# Patient Record
Sex: FEMALE | Race: BLACK OR AFRICAN AMERICAN | Employment: OTHER | URBAN - METROPOLITAN AREA
[De-identification: names, ages, dates, MRNs, and addresses within clinical notes are randomized per-mention and may not be internally consistent; named-entity substitution may affect disease eponyms.]

---

## 2017-08-16 ENCOUNTER — HOSPITAL ENCOUNTER (OUTPATIENT)
Dept: MAMMOGRAPHY | Age: 72
Discharge: HOME | End: 2017-08-16
Attending: OBSTETRICS & GYNECOLOGY
Payer: MEDICARE

## 2017-08-16 DIAGNOSIS — Z12.31 VISIT FOR SCREENING MAMMOGRAM: ICD-10-CM

## 2017-08-16 PROCEDURE — 77067 SCR MAMMO BI INCL CAD: CPT

## 2018-10-22 ENCOUNTER — HOSPITAL ENCOUNTER (OUTPATIENT)
Dept: MAMMOGRAPHY | Age: 73
Discharge: HOME | End: 2018-10-22
Attending: OBSTETRICS & GYNECOLOGY
Payer: MEDICARE

## 2018-10-22 DIAGNOSIS — Z12.31 VISIT FOR SCREENING MAMMOGRAM: ICD-10-CM

## 2018-10-22 PROCEDURE — 77067 SCR MAMMO BI INCL CAD: CPT

## 2018-11-06 ENCOUNTER — HOSPITAL ENCOUNTER (OUTPATIENT)
Dept: GENERAL RADIOLOGY | Age: 73
Discharge: HOME | End: 2018-11-06
Payer: MEDICARE

## 2018-11-06 DIAGNOSIS — R13.19 ESOPHAGEAL DYSPHAGIA: ICD-10-CM

## 2018-11-06 DIAGNOSIS — R13.10 DYSPHAGIA: ICD-10-CM

## 2018-11-06 DIAGNOSIS — R07.9 PAIN IN THE CHEST: ICD-10-CM

## 2018-11-06 PROCEDURE — G8998 SWALLOW D/C STATUS: HCPCS

## 2018-11-06 PROCEDURE — G8997 SWALLOW GOAL STATUS: HCPCS

## 2018-11-06 PROCEDURE — 92611 MOTION FLUOROSCOPY/SWALLOW: CPT

## 2018-11-06 PROCEDURE — G8996 SWALLOW CURRENT STATUS: HCPCS

## 2018-11-06 PROCEDURE — 74230 X-RAY XM SWLNG FUNCJ C+: CPT

## 2018-11-06 NOTE — PROGRESS NOTES
[facility], [address] [facility]    Speech Pathology Modified barium swallow Study  Patient: Mynor Browne (72 y.o. female)  Date: 11/6/2018  Referring Provider:  REBECCA Beverly    SUBJECTIVE:   Patient reported pain/burning in her chest with PO intake, and EGD scheduled for 11/16/18. GI asked patient if she had difficulty swallowing, and patient reported coughing with thin liquids less than once per day, and patient was sent for MBS. PMH includes EGD in 04/2016 showing gastritis, lung cancer s/p resection (no chemo or radiation), emphysema/COPD, GERD, and h/o CVA with no residual deficits. Patient denied history of PNA. OBJECTIVE:   Past Medical History:   Past Medical History:   Diagnosis Date    Arthritis     Autoimmune disease ([facility] [facility]     lupus    CAD (coronary artery disease)     Cancer ([facility] [facility]     lung cancer/ upper right lobe/ 1998    COPD     CVA (cerebral vascular accident) ([facility] [facility] June 2015    GERD (gastroesophageal reflux disease)     Hypertension     Other ill-defined conditions(799.89)     PAD    Other ill-defined conditions(799.89)     elevated cholesterol    Thyroid disease      Past Surgical History:   Procedure Laterality Date    CARDIAC SURG PROCEDURE UNLIST      cabg x5 vessels    CHEST SURGERY PROCEDURE UNLISTED      upper right lobectomy    HX APPENDECTOMY      HX CHOLECYSTECTOMY      HX ORTHOPAEDIC      left foot club foot surgery    HX TUBAL LIGATION      VASCULAR SURGERY PROCEDURE UNLIST      right leg stent    VASCULAR SURGERY PROCEDURE UNLIST      left leg surgery for blockage,no stent     Current Dietary Status:  Regular/thin, reported no difficulty with pills  Radiologist: Dr. Noel White Views: Lateral;Fluoro  Patient Position: upright in chair    Trial 1:   Consistency Presented: Thin liquid;Puree; Solid   How Presented: Self-fed/presented;Cup/gulp;Straw;Successive swallows;Spoon       Bolus Acceptance: No impairment   Bolus Formation/Control: No impairment: Piecemeal;Premature spillage   Propulsion: Delayed (# of seconds)   Oral Residue: None   Initiation of Swallow: Triggered at vallecula   Timing: No impairment   Penetration: None   Aspiration/Timing: No evidence of aspiration   Pharyngeal Clearance: No residue   Attempted Modifications: Double swallow   Effective Modifications: Double swallow   Cues for Modifications: None           Decreased Tongue Base Retraction?: No  Laryngeal Elevation: WFL (within functional limits)  Aspiration/Penetration Score: 1 (No penetration or aspiration-Contrast does not enter the airway)  Pharyngeal Symmetry: Not assessed  Pharyngeal-Esophageal Segment: No impairment  Pharyngeal Dysfunction: None    Oral Phase Severity: Other (comment)(WFL)  Pharyngeal Phase Severity: N/A     In compliance with CMSs Claims Based Outcome Reporting, the following G-code set was chosen for this patient based the use of the NOMS functional outcome to quantify this patient's level of swallowing impairment. Using the NOMS, the patient was determined to be at level 7 for swallow function which correlates with the CH= 0% level of severity. Based on the objective assessment provided within this note, the current, goal, and discharge g-codes are as follows:    Swallow  Swallowing:   Swallow Current Status CH= 0%   Swallow Goal Status CH= 0%   Swallow D/C Status CH= 0%        NOMS Swallowing Levels:  Level 1 (CN): NPO  Level 2 (CM): NPO but takes consistency in therapy  Level 3 (CL): Takes less than 50% of nutrition p.o. and continues with nonoral feedings; and/or safe with mod cues; and/or max diet restriction  Level 4 (CK):  Safe swallow but needs mod cues; and/or mod diet restriction; and/or still requires some nonoral feeding/supplements  Level 5 (CJ): Safe swallow with min diet restriction; and/or needs min cues  Level 6 (CI): Independent with p.o.; rare cues; usually self cues; may need to avoid some foods or needs extra time  Level 7 [facility]): Independent for all p.o.  HAYDE. (2003). National Outcomes Measurement System (NOMS): Adult Speech-Language Pathology User's Guide. ASSESSMENT :  Based on the objective data described above, the patient presents with [facility] oropharyngeal swallow. Patient with posterior bolus loss of thin liquids, however this is overall WFL. Double swallows noted due to piecemeal swallowing of solids. No penetration, aspiration, or pharyngeal residue observed. PLAN/RECOMMENDATIONS :  --Continue current diet  --No further SLP treatment indicated     COMMUNICATION/EDUCATION:   The above findings and recommendations were discussed with: patient who verbalized understanding.     Thank you for this referral.  Vaibhav Moran, SLP  Time Calculation: 30 mins

## 2018-11-15 ENCOUNTER — ANESTHESIA EVENT (OUTPATIENT)
Dept: ENDOSCOPY | Age: 73
End: 2018-11-15
Payer: MEDICARE

## 2018-11-16 ENCOUNTER — HOSPITAL ENCOUNTER (OUTPATIENT)
Age: 73
Setting detail: OUTPATIENT SURGERY
Discharge: HOME | End: 2018-11-16
Attending: INTERNAL MEDICINE | Admitting: INTERNAL MEDICINE
Payer: MEDICARE

## 2018-11-16 ENCOUNTER — ANESTHESIA (OUTPATIENT)
Dept: ENDOSCOPY | Age: 73
End: 2018-11-16
Payer: MEDICARE

## 2018-11-16 VITALS
SYSTOLIC BLOOD PRESSURE: 157 MMHG | DIASTOLIC BLOOD PRESSURE: 85 MMHG | OXYGEN SATURATION: 100 % | WEIGHT: 129.25 LBS | HEIGHT: 64 IN | RESPIRATION RATE: 26 BRPM | HEART RATE: 69 BPM | TEMPERATURE: 98.3 F | BODY MASS INDEX: 22.07 KG/M2

## 2018-11-16 PROCEDURE — 74011250636 HC RX REV CODE- 250/636: Performed by: INTERNAL MEDICINE

## 2018-11-16 PROCEDURE — 76060000031 HC ANESTHESIA FIRST 0.5 HR: Performed by: INTERNAL MEDICINE

## 2018-11-16 PROCEDURE — 74011250636 HC RX REV CODE- 250/636

## 2018-11-16 PROCEDURE — 77030009426 HC FCPS BIOP ENDOSC BSC -B: Performed by: INTERNAL MEDICINE

## 2018-11-16 PROCEDURE — 88305 TISSUE EXAM BY PATHOLOGIST: CPT

## 2018-11-16 PROCEDURE — 76040000019: Performed by: INTERNAL MEDICINE

## 2018-11-16 RX ORDER — PROPOFOL 10 MG/ML
INJECTION, EMULSION INTRAVENOUS AS NEEDED
Status: DISCONTINUED | OUTPATIENT
Start: 2018-11-16 | End: 2018-11-16 | Stop reason: HOSPADM

## 2018-11-16 RX ORDER — FLUMAZENIL 0.1 MG/ML
0.2 INJECTION INTRAVENOUS
Status: DISCONTINUED | OUTPATIENT
Start: 2018-11-16 | End: 2018-11-16 | Stop reason: HOSPADM

## 2018-11-16 RX ORDER — SODIUM CHLORIDE 0.9 % (FLUSH) 0.9 %
5-10 SYRINGE (ML) INJECTION EVERY 8 HOURS
Status: DISCONTINUED | OUTPATIENT
Start: 2018-11-16 | End: 2018-11-16 | Stop reason: HOSPADM

## 2018-11-16 RX ORDER — MIDAZOLAM HYDROCHLORIDE 1 MG/ML
.25-1 INJECTION, SOLUTION INTRAMUSCULAR; INTRAVENOUS
Status: DISCONTINUED | OUTPATIENT
Start: 2018-11-16 | End: 2018-11-16 | Stop reason: HOSPADM

## 2018-11-16 RX ORDER — NALOXONE HYDROCHLORIDE 0.4 MG/ML
0.4 INJECTION, SOLUTION INTRAMUSCULAR; INTRAVENOUS; SUBCUTANEOUS
Status: DISCONTINUED | OUTPATIENT
Start: 2018-11-16 | End: 2018-11-16 | Stop reason: HOSPADM

## 2018-11-16 RX ORDER — EPINEPHRINE 0.1 MG/ML
1 INJECTION INTRACARDIAC; INTRAVENOUS
Status: DISCONTINUED | OUTPATIENT
Start: 2018-11-16 | End: 2018-11-16 | Stop reason: HOSPADM

## 2018-11-16 RX ORDER — ATROPINE SULFATE 0.1 MG/ML
0.5 INJECTION INTRAVENOUS
Status: DISCONTINUED | OUTPATIENT
Start: 2018-11-16 | End: 2018-11-16 | Stop reason: HOSPADM

## 2018-11-16 RX ORDER — LIDOCAINE HYDROCHLORIDE 20 MG/ML
INJECTION, SOLUTION EPIDURAL; INFILTRATION; INTRACAUDAL; PERINEURAL AS NEEDED
Status: DISCONTINUED | OUTPATIENT
Start: 2018-11-16 | End: 2018-11-16 | Stop reason: HOSPADM

## 2018-11-16 RX ORDER — SODIUM CHLORIDE 0.9 % (FLUSH) 0.9 %
5-10 SYRINGE (ML) INJECTION AS NEEDED
Status: DISCONTINUED | OUTPATIENT
Start: 2018-11-16 | End: 2018-11-16 | Stop reason: HOSPADM

## 2018-11-16 RX ORDER — DEXTROMETHORPHAN/PSEUDOEPHED 2.5-7.5/.8
1.2 DROPS ORAL
Status: DISCONTINUED | OUTPATIENT
Start: 2018-11-16 | End: 2018-11-16 | Stop reason: HOSPADM

## 2018-11-16 RX ORDER — SODIUM CHLORIDE 9 MG/ML
INJECTION, SOLUTION INTRAVENOUS
Status: DISCONTINUED | OUTPATIENT
Start: 2018-11-16 | End: 2018-11-16 | Stop reason: HOSPADM

## 2018-11-16 RX ORDER — SODIUM CHLORIDE 9 MG/ML
50 INJECTION, SOLUTION INTRAVENOUS CONTINUOUS
Status: DISCONTINUED | OUTPATIENT
Start: 2018-11-16 | End: 2018-11-16 | Stop reason: HOSPADM

## 2018-11-16 RX ORDER — FENTANYL CITRATE 50 UG/ML
100 INJECTION, SOLUTION INTRAMUSCULAR; INTRAVENOUS
Status: DISCONTINUED | OUTPATIENT
Start: 2018-11-16 | End: 2018-11-16 | Stop reason: HOSPADM

## 2018-11-16 RX ADMIN — PROPOFOL 20 MG: 10 INJECTION, EMULSION INTRAVENOUS at 14:28

## 2018-11-16 RX ADMIN — SODIUM CHLORIDE: 9 INJECTION, SOLUTION INTRAVENOUS at 14:13

## 2018-11-16 RX ADMIN — LIDOCAINE HYDROCHLORIDE 40 MG: 20 INJECTION, SOLUTION EPIDURAL; INFILTRATION; INTRACAUDAL; PERINEURAL at 14:25

## 2018-11-16 RX ADMIN — PROPOFOL 20 MG: 10 INJECTION, EMULSION INTRAVENOUS at 14:26

## 2018-11-16 RX ADMIN — PROPOFOL 20 MG: 10 INJECTION, EMULSION INTRAVENOUS at 14:27

## 2018-11-16 RX ADMIN — PROPOFOL 20 MG: 10 INJECTION, EMULSION INTRAVENOUS at 14:29

## 2018-11-16 RX ADMIN — PROPOFOL 60 MG: 10 INJECTION, EMULSION INTRAVENOUS at 14:25

## 2018-11-16 NOTE — ANESTHESIA POSTPROCEDURE EVALUATION
Post-Anesthesia Evaluation and Assessment Patient: Praveen Berry MRN: [id number]  SSN: [id number] YOB: 1945  Age: 74 y.o. Sex: female I have evaluated the patient and they are stable and ready for discharge from the PACU. Cardiovascular Function/Vital Signs Visit Vitals /85 Pulse 69 Temp 36.8 °C (98.3 °F) Resp 26 Ht 5' 4\" (1.626 m) Wt 58.6 kg (129 lb 4 oz) SpO2 100% Breastfeeding? No  
BMI 22.19 kg/m² Patient is status post MAC anesthesia for Procedure(s): ESOPHAGOGASTRODUODENOSCOPY (EGD) ESOPHAGOGASTRODUODENAL (EGD) BIOPSY. Nausea/Vomiting: None Postoperative hydration reviewed and adequate. Pain: 
Pain Scale 1: Numeric (0 - 10) (11/16/18 1458) Pain Intensity 1: 0 (11/16/18 1458) Managed Neurological Status: At baseline Mental Status, Level of Consciousness: Alert and  oriented to person, place, and time Pulmonary Status:  
O2 Device: Room air (11/16/18 1458) Adequate oxygenation and airway patent Complications related to anesthesia: None Post-anesthesia assessment completed. No concerns Signed By: River Maciel MD   
 November 16, 2018 Procedure(s): ESOPHAGOGASTRODUODENOSCOPY (EGD) ESOPHAGOGASTRODUODENAL (EGD) BIOPSY. <BSHSIANPOST> Visit Vitals /85 Pulse 69 Temp 36.8 °C (98.3 °F) Resp 26 Ht 5' 4\" (1.626 m) Wt 58.6 kg (129 lb 4 oz) SpO2 100% Breastfeeding? No  
BMI 22.19 kg/m²

## 2018-11-16 NOTE — ROUTINE PROCESS
Yisel Migdalia 1945 
[id number] Situation: 
Verbal report received from: Celeste RN Procedure: Procedure(s): ESOPHAGOGASTRODUODENOSCOPY (EGD) ESOPHAGOGASTRODUODENAL (EGD) BIOPSY Background: 
 
Preoperative diagnosis: CHEST PAIN, DYSPHAGIA, GERD, LEFT FLANK PAIN Postoperative diagnosis: mild gastritis, hiatal hernia :  Dr. Azar Mendez Assistant(s): Endoscopy Technician-1: Jena Alston Endoscopy RN-1: Dmitry Presley RN Specimens:  
ID Type Source Tests Collected by Time Destination 1 : pathology Preservative Gastric  Devin Menjivar MD 11/16/2018 1428 Pathology 2 : EWSOPHAGUS Preservative   Devin Menjivar MD 11/16/2018 1430 Pathology H. Pylori  no Assessment: 
Intra-procedure medications Anesthesia gave intra-procedure sedation and medications, see anesthesia flow sheet yes Intravenous fluids: NS@ Regino Pearce Vital signs stable Abdominal assessment: round and soft Recommendation: 
Discharge patient per MD order. Family or Friend Permission to share finding with family or friend yes

## 2018-11-16 NOTE — PROCEDURES
[address] [facility] [address]  [address] [place], [address]  [phone]                            NAME:  Jerrilyn Mcburney   :   1945   MRN:   [id number]     Date/Time:  2018 2:35 PM    Esophagogastroduodenoscopy (EGD) Procedure Note    :  Ann Sánchez MD    Referring Provider:  John Villa MD    Anethesia/Sedation:  MAC anesthesia    Procedure Details   After infomed consent was obtained for the procedure, with all risks and benefits of procedure explained the patient was taken to the endoscopy suite and placed in the left lateral decubitus position. Following sequential administration of sedation as per above, the gastroscope was inserted into the mouth and advanced under direct vision to second portion of the duodenum. A careful inspection was made as the gastroscope was withdrawn, including a retroflexed view of the proximal stomach; findings and interventions are described below. Findings:  Esophagus: normal esophageal mucosa, biopsies obtained from mid-esophagus. GE junction at 36 cm from the incisors. 3 cm sliding hiatal hernia. No narrowing or stenosis. Stomach:diffuse mild gastritis, biopsies obtained  Duodenum/jejunum:normal    Interventions:  biopsy of stomach and biopsy of esophagus           Specimens Removed:    ID Type Source Tests Collected by Time Destination   1 : pathology Preservative Gastric  Ronni Peraza MD 2018 1428 Pathology   2 : [facility] Preservative   Ronni Peraza MD 2018 1430 Pathology       Complications: None. EBL:  Minimal    Impression:    See Postoperative diagnosis above    Recommendations:   - Await pathology. You should receive a letter within 2 weeks. - Resume normal medications.   - Follow up in GI clinic    Discharge disposition:  Home in the company of  when able to ambulate    Ann Sánchez MD

## 2018-11-16 NOTE — DISCHARGE INSTRUCTIONS
[address]     Gastritis: Care Instructions  Your Care Instructions    Gastritis is a sore and upset stomach. It happens when something irritates the stomach lining. Many things can cause it. These include an infection such as the flu or something you ate or drank. Medicines or a sore on the lining of the stomach (ulcer) also can cause it. Your belly may bloat and ache. You may belch, vomit, and feel sick to your stomach. You should be able to relieve the problem by taking medicine. And it may help to change your diet. If gastritis lasts, your doctor may prescribe medicine. Follow-up care is a key part of your treatment and safety. Be sure to make and go to all appointments, and call your doctor if you are having problems. It's also a good idea to know your test results and keep a list of the medicines you take. How can you care for yourself at home? · If your doctor prescribed antibiotics, take them as directed. Do not stop taking them just because you feel better. You need to take the full course of antibiotics. · Be safe with medicines. If your doctor prescribed medicine to decrease stomach acid, take it as directed. Call your doctor if you think you are having a problem with your medicine. · Do not take any other medicine, including over-the-counter pain relievers, without talking to your doctor first.  · If your doctor recommends over-the-counter medicine to reduce stomach acid, such as Pepcid AC, Prilosec, Tagamet HB, or Zantac 75, follow the directions on the label. · Drink plenty of fluids (enough so that your urine is light yellow or clear like water) to prevent dehydration. Choose water and other caffeine-free clear liquids. If you have kidney, heart, or liver disease and have to limit fluids, talk with your doctor before you increase the amount of fluids you drink. · Limit how much alcohol you drink.   · Avoid coffee, tea, cola drinks, chocolate, and other foods with caffeine. They increase stomach acid. When should you call for help? Call 911 anytime you think you may need emergency care. For example, call if:    · You vomit blood or what looks like coffee grounds.     · You pass maroon or very bloody stools.    Call your doctor now or seek immediate medical care if:    · You start breathing fast and have not produced urine in the last 8 hours.     · You cannot keep fluids down.    Watch closely for changes in your health, and be sure to contact your doctor if:    · You do not get better as expected. Where can you learn more? Go to http://bobbyModa2Rideprince.info/. Enter [id number] in the search box to learn more about \"Gastritis: Care Instructions. \"  Current as of: March 28, 2018  Content Version: 11.8  © 0635-8822 [employer] Care instructions adapted under license by [employer] (which disclaims liability or warranty for this information). If you have questions about a medical condition or this instruction, always ask your healthcare professional. [place] [address] any warranty or liability for your use of this information. Hiatal Hernia: Care Instructions  Your Care Instructions  A hiatal hernia occurs when part of the stomach bulges into the chest cavity. A hiatal hernia may allow stomach acid and juices to back up into the esophagus (acid reflux). This can cause a feeling of burning, warmth, heat, or pain behind the breastbone. This feeling may often occur after you eat, soon after you lie down, or when you bend forward, and it may come and go. You also may have a sour taste in your mouth. These symptoms are commonly known as heartburn or reflux. But not all hiatal hernias cause symptoms. Follow-up care is a key part of your treatment and safety. Be sure to make and go to all appointments, and call your doctor if you are having problems.  It's also a good idea to know your test results and keep a list of the medicines you take. How can you care for yourself at home? · Take your medicines exactly as prescribed. Call your doctor if you think you are having a problem with your medicine. · Do not take aspirin or other nonsteroidal anti-inflammatory drugs (NSAIDs), such as ibuprofen (Advil, Motrin) or naproxen (Aleve), unless your doctor says it is okay. Ask your doctor what you can take for pain. · Your doctor may recommend over-the-counter medicine. For mild or occasional indigestion, antacids such as Tums, Gaviscon, Maalox, or Mylanta may help. Your doctor also may recommend over-the-counter acid reducers, such as famotidine (Pepcid AC), cimetidine (Tagamet HB), ranitidine (Zantac 75 and Zantac 150), or omeprazole (Prilosec). Read and follow all instructions on the label. If you use these medicines often, talk with your doctor. · Change your eating habits. ? It's best to eat several small meals instead of two or three large meals. ? After you eat, wait 2 to 3 hours before you lie down. Late-night snacks aren't a good idea. ? Chocolate, mint, and alcohol can make heartburn worse. They relax the valve between the esophagus and the stomach. ? Spicy foods, foods that have a lot of acid (like tomatoes and oranges), and coffee can make heartburn symptoms worse in some people. If your symptoms are worse after you eat a certain food, you may want to stop eating that food to see if your symptoms get better. · Do not smoke or chew tobacco.  · If you get heartburn at night, raise the head of your bed 6 to 8 inches by putting the frame on blocks or placing a foam wedge under the head of your mattress. (Adding extra pillows does not work.)  · Do not wear tight clothing around your middle. · Lose weight if you need to. Losing just 5 to 10 pounds can help. When should you call for help?   Call your doctor now or seek immediate medical care if:    · You have new or worse belly pain.     · You are vomiting.    Watch closely for changes in your health, and be sure to contact your doctor if:    · You have new or worse symptoms of indigestion.     · You have trouble or pain swallowing.     · You are losing weight.     · You do not get better as expected. Where can you learn more? Go to http://bobby-prince.info/. Enter Y119 in the search box to learn more about \"Hiatal Hernia: Care Instructions. \"  Current as of: March 28, 2018  Content Version: 11.8  © 2122-1041 [employer]. Care instructions adapted under license by [employer] (which disclaims liability or warranty for this information). If you have questions about a medical condition or this instruction, always ask your healthcare professional. [place] [address] any warranty or liability for your use of this information. [address] [place] [address]  [phone]                                  Calvin Bowser  [id number]  1945    It was my pleasure seeing you for your procedure. You will also receive a summary report with the findings from this procedure and any further recommendations. If you had polyps removed or biopsies taken during your procedure, you will receive a separate letter from me within the next 2 weeks. If you don't receive this letter or if you have any questions, please call my office [phone]. Please take note of the post procedure instructions listed below. Best Wishes,    Dr. Joey Pham    These instructions give you information on caring for yourself after your procedure. Call your doctor if you have any problems or questions after your procedure. HOME CARE  · Walk if you have belly cramping or gas. Walking will help get rid of the air and reduce the bloated feeling in your belly (abdomen). · Your IV site (where you received drugs) may be tender to touch.   Place warm towels on the site; keep your arm up on two pillows if you have any swelling or soreness in the area. · You may shower. ACTIVITY:  · Take frequent rest periods and move at a slower pace for the next 24 hours. .  · You may resume your regular activity tomorrow if you are feeling back to normal.  · Do not drive or ride a bicycle for at least 24 hours (because of the medicine (anesthesia) used during the test). · Do not sign any important legal documents or use or operate any machinery for 24 hours  · Do not take sleeping medicines/nerve drugs for 24 hours unless the doctor tells you. · You can return to work/school tomorrow unless otherwise instructed. NUTRITION:  · Drink plenty of fluids to keep your pee (urine) clear or pale yellow  · Begin with a light meal and progress to your normal diet. Heavy or fried foods are harder to digest and may make you feel sick to your stomach (nauseated). · Once you are feeling back to normal, you may resume your normal diet as instructed by your doctor. · Avoid alcoholic beverages for 24 hours or as instructed. IF YOU HAD BIOPSIES TAKEN OR POLYPS REMOVED DURING THE PROCEDURE:  · For the next 7 days, avoid all non-steroidal antiinflammatory medications such as Ibuprofen, Motrin, Advil, Alleve, Priti-seltzer, Goody's powder, BC powder. · If you do not have an heart condition that requires you to take a daily aspirin, you should avoid taking aspirin for 7 days. · Eat a soft diet for 24 hours. · Monitor your stools for any blood or dark black, tar-like, stools as this may be a sign of bleeding and if you see any blood, notify your doctor immediately. GET HELP RIGHT AWAY AND SEEK IMMEDIATE MEDICAL CARE IF:  · You have more than a spotting of blood in your stool. · You pass clumps of tissue (blood clots) or fill the toilet with blood. · Your belly is painfully swollen or puffy (abdominal distention). · You throw up (vomit). · You have a fever.   · You have redness, pain or swelling at the IV site that last greater than two days.  · You have abdominal pain or discomfort that is severe or gets worse throughout the day. Post-procedure diagnosis:  mild gastritis, hiatal hernia    Post-procedure recommendations:  - Await pathology. You should receive a letter within 2 weeks. - Resume normal medications.   - Follow up in GI clinic

## 2018-11-16 NOTE — ANESTHESIA PREPROCEDURE EVALUATION
Anesthetic History No history of anesthetic complications Review of Systems / Medical History Patient summary reviewed, nursing notes reviewed and pertinent labs reviewed Pulmonary COPD Neuro/Psych CVA Cardiovascular Hypertension CHF Past MI, CAD, PAD, CABG and hyperlipidemia GI/Hepatic/Renal 
  
GERD Endo/Other Hypothyroidism Arthritis Other Findings Comments: Hx SLE Physical Exam 
 
Airway Mallampati: II 
TM Distance: > 6 cm Neck ROM: normal range of motion Mouth opening: Normal 
 
 Cardiovascular Regular rate and rhythm,  S1 and S2 normal,  no murmur, click, rub, or gallop Dental 
 
Dentition: Full lower dentures and Full upper dentures Pulmonary Breath sounds clear to auscultation Abdominal 
GI exam deferred Other Findings Anesthetic Plan ASA: 3 Anesthesia type: MAC Induction: Intravenous Anesthetic plan and risks discussed with: Patient

## 2018-11-16 NOTE — H&P
[address] [facility] 
[address] [place], [address] 
[phone] History and Physical  
 
NAME: Sammi Arias :  1945 MRN:  [id number] HPI:  The patient was seen and examined. Past Surgical History:  
Procedure Laterality Date  CARDIAC SURG PROCEDURE UNLIST    
 cabg x5 vessels  CHEST SURGERY PROCEDURE UNLISTED    
 upper right lobectomy  HX APPENDECTOMY  HX CHOLECYSTECTOMY  HX ORTHOPAEDIC    
 left foot club foot surgery  HX TUBAL LIGATION    
 VASCULAR SURGERY PROCEDURE UNLIST    
 right leg stent  VASCULAR SURGERY PROCEDURE UNLIST    
 left leg surgery for blockage,no stent Past Medical History:  
Diagnosis Date  Arthritis  Autoimmune disease ([facility] [address] lupus  CAD (coronary artery disease)  Cancer ([facility] [address] [facility] lung cancer/ upper right lobe/   COPD  CVA ([facility]rebral vascular accident) [facility]) 2015  GERD (gastroesophageal reflux disease)  Hypertension  Other ill-defined conditions(799.89) PAD  Other ill-defined conditions(799.89)   
 elevated cholesterol  Thyroid disease Social History Tobacco Use  Smoking status: Former Smoker Packs/day: 1.00 Years: 42.00 Pack years: 42.00 Last attempt to quit: 1998 Years since quittin.5  Smokeless tobacco: Never Used Substance Use Topics  Alcohol use: No  
 Drug use: No  
 
Allergies Allergen Reactions  Adhesive Rash  Bactrim [Sulfamethoprim Ds] Nausea Only  Cardizem [Diltiazem Hcl] Swelling  
  lightheadness History reviewed. No pertinent family history. No current facility-administered medications for this encounter. PHYSICAL EXAM: 
General: WD, WN. Alert, cooperative, no acute distress   
HEENT: NC, Atraumatic. PERRLA, EOMI. Anicteric sclerae. Lungs:  CTA Bilaterally. No Wheezing/Rhonchi/Rales. Heart:  Regular  rhythm,  No murmur, No Rubs, No Gallops Abdomen: Soft, Non distended, Non tender.  +Bowel sounds, no HSM Extremities: No c/c/e Neurologic:  CN 2-12 gi, Alert and oriented X 3. No acute neurological distress Psych:   Good insight. Not anxious nor agitated. The heart, lungs and mental status were satisfactory for the administration of MAC sedation and for the procedure. Mallampati score: 2 Assessment: · Chest pain, GERD Plan:  
· Endoscopic procedure :EGD 
· MAC sedation

## 2019-11-26 ENCOUNTER — HOSPITAL ENCOUNTER (OUTPATIENT)
Dept: MAMMOGRAPHY | Age: 74
Discharge: HOME | End: 2019-11-26
Attending: OBSTETRICS & GYNECOLOGY

## 2019-11-26 DIAGNOSIS — Z12.31 VISIT FOR SCREENING MAMMOGRAM: ICD-10-CM

## 2020-02-19 ENCOUNTER — HOSPITAL ENCOUNTER (OUTPATIENT)
Dept: CARDIAC REHAB | Age: 75
Discharge: HOME | End: 2020-02-19
Payer: MEDICARE

## 2020-02-19 PROCEDURE — 93798 PHYS/QHP OP CAR RHAB W/ECG: CPT

## 2020-02-19 RX ORDER — IPRATROPIUM BROMIDE 42 UG/1
2 SPRAY, METERED NASAL DAILY
COMMUNITY

## 2020-02-19 RX ORDER — RANOLAZINE 500 MG/1
500 TABLET, EXTENDED RELEASE ORAL 2 TIMES DAILY
COMMUNITY

## 2020-02-19 RX ORDER — FUROSEMIDE 40 MG/1
40 TABLET ORAL DAILY
COMMUNITY

## 2020-02-19 NOTE — CARDIO/PULMONARY
Cardiopulmonary Rehab Orientation: Met with Mrs. Do Ramsey  for the initial session. Mrs. Rossy Cortes is a 66year-old patient of Dr. June Eaton, who presents to rehab for cardiac conditioning and strengthening, S/P CHF; LVEF 15-20 %. History also includes breast Ca, CVA, NSTEMI, leg stents, CAD, CABG, Lupus, COPD, HTN, and HLD. Allergy to Bactrim and Cardizem. Immunization for influenza vaccine UTD. Pt is a former smoker. Lungs were CTA; denied any cough. Bilateral edema 2+, she is on Lasix. Exercise at home includes:  none Patient was given an educational notebook. Psychosocial: Pt is  and has 2 supportive children. Her daughter takes her to most appointments. Pt scored a 6 on PHQ-9 screening tool and PCP faxed. She denies depression and anxiety. Pt admits to moderate stress related to medical finances. She enjoys reading and playing her iPad games for relaxation. BMI 21, based on height of 5'3\" and weight 123 lbs. Pt completed 6MW test on treadmill with 170 meters and 2.2 METS, with slight SOB. Patient's identified goals are:  
1. Increase strength in legs (rid of rollator) 2. Increase endurance/stamina 3. Gain weight Plan: Return for first exercise session on 2/21/19

## 2020-02-20 VITALS — WEIGHT: 123 LBS | BODY MASS INDEX: 21.11 KG/M2

## 2020-02-21 ENCOUNTER — HOSPITAL ENCOUNTER (OUTPATIENT)
Dept: CARDIAC REHAB | Age: 75
Discharge: HOME | End: 2020-02-21
Payer: MEDICARE

## 2020-02-21 VITALS — BODY MASS INDEX: 20.94 KG/M2 | WEIGHT: 122 LBS

## 2020-02-21 PROCEDURE — 93798 PHYS/QHP OP CAR RHAB W/ECG: CPT

## 2020-02-26 ENCOUNTER — APPOINTMENT (OUTPATIENT)
Dept: CARDIAC REHAB | Age: 75
End: 2020-02-26
Payer: MEDICARE

## 2020-02-28 ENCOUNTER — APPOINTMENT (OUTPATIENT)
Dept: GENERAL RADIOLOGY | Age: 75
DRG: 315 | End: 2020-02-28
Attending: EMERGENCY MEDICINE
Payer: MEDICARE

## 2020-02-28 ENCOUNTER — HOSPITAL ENCOUNTER (INPATIENT)
Age: 75
LOS: 4 days | Discharge: HOME | DRG: 315 | End: 2020-03-03
Attending: EMERGENCY MEDICINE | Admitting: HOSPITALIST
Payer: MEDICARE

## 2020-02-28 DIAGNOSIS — E86.0 DEHYDRATION: ICD-10-CM

## 2020-02-28 DIAGNOSIS — N17.9 ACUTE RENAL FAILURE, UNSPECIFIED ACUTE RENAL FAILURE TYPE (HCC): Primary | ICD-10-CM

## 2020-02-28 DIAGNOSIS — I95.9 HYPOTENSION, UNSPECIFIED HYPOTENSION TYPE: ICD-10-CM

## 2020-02-28 PROBLEM — I50.22 CHRONIC SYSTOLIC CHF (CONGESTIVE HEART FAILURE) (HCC): Status: ACTIVE | Noted: 2020-02-28

## 2020-02-28 LAB
ALBUMIN SERPL-MCNC: 3.4 G/DL (ref 3.5–5)
ALBUMIN/GLOB SERPL: 1 {RATIO} (ref 1.1–2.2)
ALP SERPL-CCNC: 97 U/L (ref 45–117)
ALT SERPL-CCNC: 38 U/L (ref 12–78)
ANION GAP SERPL CALC-SCNC: 6 MMOL/L (ref 5–15)
APPEARANCE UR: ABNORMAL
AST SERPL-CCNC: 52 U/L (ref 15–37)
ATRIAL RATE: 66 BPM
BACTERIA URNS QL MICRO: ABNORMAL /HPF
BASOPHILS # BLD: 0 K/UL (ref 0–0.1)
BASOPHILS NFR BLD: 1 % (ref 0–1)
BILIRUB SERPL-MCNC: 0.6 MG/DL (ref 0.2–1)
BILIRUB UR QL CFM: NEGATIVE
BNP SERPL-MCNC: ABNORMAL PG/ML
BUN SERPL-MCNC: 40 MG/DL (ref 6–20)
BUN/CREAT SERPL: 16 (ref 12–20)
CALCIUM SERPL-MCNC: 8.7 MG/DL (ref 8.5–10.1)
CALCULATED R AXIS, ECG10: -95 DEGREES
CALCULATED T AXIS, ECG11: 85 DEGREES
CHLORIDE SERPL-SCNC: 100 MMOL/L (ref 97–108)
CK SERPL-CCNC: 176 U/L (ref 26–192)
CO2 SERPL-SCNC: 27 MMOL/L (ref 21–32)
COLOR UR: ABNORMAL
CREAT SERPL-MCNC: 2.53 MG/DL (ref 0.55–1.02)
DIAGNOSIS, 93000: NORMAL
DIFFERENTIAL METHOD BLD: ABNORMAL
EOSINOPHIL # BLD: 0 K/UL (ref 0–0.4)
EOSINOPHIL NFR BLD: 0 % (ref 0–7)
EPITH CASTS URNS QL MICRO: ABNORMAL /LPF
ERYTHROCYTE [DISTWIDTH] IN BLOOD BY AUTOMATED COUNT: 18 % (ref 11.5–14.5)
GLOBULIN SER CALC-MCNC: 3.4 G/DL (ref 2–4)
GLUCOSE SERPL-MCNC: 89 MG/DL (ref 65–100)
GLUCOSE UR STRIP.AUTO-MCNC: NEGATIVE MG/DL
HCT VFR BLD AUTO: 42.9 % (ref 35–47)
HGB BLD-MCNC: 14 G/DL (ref 11.5–16)
HGB UR QL STRIP: NEGATIVE
IMM GRANULOCYTES # BLD AUTO: 0 K/UL (ref 0–0.04)
IMM GRANULOCYTES NFR BLD AUTO: 0 % (ref 0–0.5)
KETONES UR QL STRIP.AUTO: ABNORMAL MG/DL
LEUKOCYTE ESTERASE UR QL STRIP.AUTO: ABNORMAL
LYMPHOCYTES # BLD: 0.9 K/UL (ref 0.8–3.5)
LYMPHOCYTES NFR BLD: 28 % (ref 12–49)
MCH RBC QN AUTO: 27.7 PG (ref 26–34)
MCHC RBC AUTO-ENTMCNC: 32.6 G/DL (ref 30–36.5)
MCV RBC AUTO: 84.8 FL (ref 80–99)
MONOCYTES # BLD: 0.4 K/UL (ref 0–1)
MONOCYTES NFR BLD: 12 % (ref 5–13)
NEUTS SEG # BLD: 1.9 K/UL (ref 1.8–8)
NEUTS SEG NFR BLD: 60 % (ref 32–75)
NITRITE UR QL STRIP.AUTO: POSITIVE
NRBC # BLD: 0 K/UL (ref 0–0.01)
NRBC BLD-RTO: 0 PER 100 WBC
P-R INTERVAL, ECG05: 288 MS
PH UR STRIP: 5 [PH] (ref 5–8)
PLATELET # BLD AUTO: 109 K/UL (ref 150–400)
PMV BLD AUTO: 12.2 FL (ref 8.9–12.9)
POTASSIUM SERPL-SCNC: 4.4 MMOL/L (ref 3.5–5.1)
PROT SERPL-MCNC: 6.8 G/DL (ref 6.4–8.2)
PROT UR STRIP-MCNC: 300 MG/DL
Q-T INTERVAL, ECG07: 506 MS
QRS DURATION, ECG06: 210 MS
QTC CALCULATION (BEZET), ECG08: 530 MS
RBC # BLD AUTO: 5.06 M/UL (ref 3.8–5.2)
RBC #/AREA URNS HPF: ABNORMAL /HPF
SODIUM SERPL-SCNC: 133 MMOL/L (ref 136–145)
SP GR UR REFRACTOMETRY: 1.02 (ref 1–1.03)
TROPONIN I SERPL-MCNC: 0.66 NG/ML
TROPONIN I SERPL-MCNC: 0.82 NG/ML
UA: UC IF INDICATED,UAUC: ABNORMAL
UROBILINOGEN UR QL STRIP.AUTO: 1 EU/DL (ref 0.2–1)
VENTRICULAR RATE, ECG03: 66 BPM
WBC # BLD AUTO: 3.2 K/UL (ref 3.6–11)
WBC CASTS URNS QL MICRO: ABNORMAL /LPF
WBC URNS QL MICRO: ABNORMAL /HPF
YEAST BUDDING URNS QL: PRESENT

## 2020-02-28 PROCEDURE — 81001 URINALYSIS AUTO W/SCOPE: CPT

## 2020-02-28 PROCEDURE — 94640 AIRWAY INHALATION TREATMENT: CPT

## 2020-02-28 PROCEDURE — 80053 COMPREHEN METABOLIC PANEL: CPT

## 2020-02-28 PROCEDURE — 96360 HYDRATION IV INFUSION INIT: CPT

## 2020-02-28 PROCEDURE — 84484 ASSAY OF TROPONIN QUANT: CPT

## 2020-02-28 PROCEDURE — 74011250637 HC RX REV CODE- 250/637: Performed by: EMERGENCY MEDICINE

## 2020-02-28 PROCEDURE — 94761 N-INVAS EAR/PLS OXIMETRY MLT: CPT

## 2020-02-28 PROCEDURE — 87086 URINE CULTURE/COLONY COUNT: CPT

## 2020-02-28 PROCEDURE — 99284 EMERGENCY DEPT VISIT MOD MDM: CPT

## 2020-02-28 PROCEDURE — 82550 ASSAY OF CK (CPK): CPT

## 2020-02-28 PROCEDURE — 74011250636 HC RX REV CODE- 250/636: Performed by: INTERNAL MEDICINE

## 2020-02-28 PROCEDURE — 71045 X-RAY EXAM CHEST 1 VIEW: CPT

## 2020-02-28 PROCEDURE — 83880 ASSAY OF NATRIURETIC PEPTIDE: CPT

## 2020-02-28 PROCEDURE — 74011000250 HC RX REV CODE- 250: Performed by: EMERGENCY MEDICINE

## 2020-02-28 PROCEDURE — 74011250636 HC RX REV CODE- 250/636: Performed by: EMERGENCY MEDICINE

## 2020-02-28 PROCEDURE — 87077 CULTURE AEROBIC IDENTIFY: CPT

## 2020-02-28 PROCEDURE — 85025 COMPLETE CBC W/AUTO DIFF WBC: CPT

## 2020-02-28 PROCEDURE — 36415 COLL VENOUS BLD VENIPUNCTURE: CPT

## 2020-02-28 PROCEDURE — 93005 ELECTROCARDIOGRAM TRACING: CPT

## 2020-02-28 PROCEDURE — 65660000000 HC RM CCU STEPDOWN

## 2020-02-28 PROCEDURE — 74011250637 HC RX REV CODE- 250/637: Performed by: INTERNAL MEDICINE

## 2020-02-28 PROCEDURE — 77030029684 HC NEB SM VOL KT MONA -A

## 2020-02-28 PROCEDURE — 87186 SC STD MICRODIL/AGAR DIL: CPT

## 2020-02-28 RX ORDER — IPRATROPIUM BROMIDE 42 UG/1
2 SPRAY, METERED NASAL DAILY
Status: DISCONTINUED | OUTPATIENT
Start: 2020-02-29 | End: 2020-03-03 | Stop reason: HOSPADM

## 2020-02-28 RX ORDER — DOCUSATE SODIUM 100 MG/1
100 CAPSULE, LIQUID FILLED ORAL
Status: DISCONTINUED | OUTPATIENT
Start: 2020-02-28 | End: 2020-03-03 | Stop reason: HOSPADM

## 2020-02-28 RX ORDER — SODIUM CHLORIDE 0.9 % (FLUSH) 0.9 %
5-40 SYRINGE (ML) INJECTION EVERY 8 HOURS
Status: DISCONTINUED | OUTPATIENT
Start: 2020-02-28 | End: 2020-03-03 | Stop reason: HOSPADM

## 2020-02-28 RX ORDER — ASPIRIN 325 MG
325 TABLET ORAL ONCE
Status: COMPLETED | OUTPATIENT
Start: 2020-02-28 | End: 2020-02-28

## 2020-02-28 RX ORDER — IPRATROPIUM BROMIDE 0.5 MG/2.5ML
0.5 SOLUTION RESPIRATORY (INHALATION)
Status: DISCONTINUED | OUTPATIENT
Start: 2020-02-28 | End: 2020-02-29

## 2020-02-28 RX ORDER — ARFORMOTEROL TARTRATE 15 UG/2ML
15 SOLUTION RESPIRATORY (INHALATION)
Status: DISCONTINUED | OUTPATIENT
Start: 2020-02-28 | End: 2020-02-29

## 2020-02-28 RX ORDER — MIDODRINE HYDROCHLORIDE 5 MG/1
10 TABLET ORAL ONCE
Status: COMPLETED | OUTPATIENT
Start: 2020-02-28 | End: 2020-02-28

## 2020-02-28 RX ORDER — RANOLAZINE 500 MG/1
500 TABLET, EXTENDED RELEASE ORAL 2 TIMES DAILY
Status: DISCONTINUED | OUTPATIENT
Start: 2020-02-28 | End: 2020-03-03 | Stop reason: HOSPADM

## 2020-02-28 RX ORDER — LEVOTHYROXINE SODIUM 112 UG/1
112 TABLET ORAL
Status: DISCONTINUED | OUTPATIENT
Start: 2020-02-29 | End: 2020-03-03 | Stop reason: HOSPADM

## 2020-02-28 RX ORDER — SODIUM CHLORIDE 0.9 % (FLUSH) 0.9 %
5-40 SYRINGE (ML) INJECTION AS NEEDED
Status: DISCONTINUED | OUTPATIENT
Start: 2020-02-28 | End: 2020-03-03 | Stop reason: HOSPADM

## 2020-02-28 RX ORDER — HYDROXYCHLOROQUINE SULFATE 200 MG/1
200 TABLET, FILM COATED ORAL 2 TIMES DAILY
Status: DISCONTINUED | OUTPATIENT
Start: 2020-02-28 | End: 2020-03-03 | Stop reason: HOSPADM

## 2020-02-28 RX ORDER — HEPARIN SODIUM 5000 [USP'U]/ML
5000 INJECTION, SOLUTION INTRAVENOUS; SUBCUTANEOUS EVERY 8 HOURS
Status: DISCONTINUED | OUTPATIENT
Start: 2020-02-28 | End: 2020-03-03 | Stop reason: HOSPADM

## 2020-02-28 RX ORDER — ALBUTEROL SULFATE 90 UG/1
2 AEROSOL, METERED RESPIRATORY (INHALATION)
Status: DISCONTINUED | OUTPATIENT
Start: 2020-02-28 | End: 2020-03-03 | Stop reason: HOSPADM

## 2020-02-28 RX ORDER — GUAIFENESIN 100 MG/5ML
81 LIQUID (ML) ORAL DAILY
Status: DISCONTINUED | OUTPATIENT
Start: 2020-02-29 | End: 2020-03-03 | Stop reason: HOSPADM

## 2020-02-28 RX ORDER — ACETAMINOPHEN 325 MG/1
650 TABLET ORAL
Status: DISCONTINUED | OUTPATIENT
Start: 2020-02-28 | End: 2020-03-03 | Stop reason: HOSPADM

## 2020-02-28 RX ORDER — PRAVASTATIN SODIUM 40 MG/1
40 TABLET ORAL
Status: DISCONTINUED | OUTPATIENT
Start: 2020-02-28 | End: 2020-03-03 | Stop reason: HOSPADM

## 2020-02-28 RX ORDER — SODIUM CHLORIDE 9 MG/ML
50 INJECTION, SOLUTION INTRAVENOUS CONTINUOUS
Status: DISPENSED | OUTPATIENT
Start: 2020-02-28 | End: 2020-02-29

## 2020-02-28 RX ADMIN — MIDODRINE HYDROCHLORIDE 10 MG: 5 TABLET ORAL at 17:21

## 2020-02-28 RX ADMIN — SODIUM CHLORIDE 250 ML: 900 INJECTION, SOLUTION INTRAVENOUS at 14:30

## 2020-02-28 RX ADMIN — HYDROXYCHLOROQUINE SULFATE 200 MG: 200 TABLET ORAL at 18:48

## 2020-02-28 RX ADMIN — Medication 10 ML: at 17:18

## 2020-02-28 RX ADMIN — ASPIRIN 325 MG: 325 TABLET, FILM COATED ORAL at 14:30

## 2020-02-28 RX ADMIN — HEPARIN SODIUM 5000 UNITS: 5000 INJECTION INTRAVENOUS; SUBCUTANEOUS at 17:19

## 2020-02-28 RX ADMIN — PRAVASTATIN SODIUM 40 MG: 40 TABLET ORAL at 21:36

## 2020-02-28 RX ADMIN — Medication 10 ML: at 21:37

## 2020-02-28 RX ADMIN — SODIUM CHLORIDE 250 ML: 900 INJECTION, SOLUTION INTRAVENOUS at 13:22

## 2020-02-28 RX ADMIN — SODIUM CHLORIDE 50 ML/HR: 900 INJECTION, SOLUTION INTRAVENOUS at 17:18

## 2020-02-28 RX ADMIN — ARFORMOTEROL TARTRATE 15 MCG: 15 SOLUTION RESPIRATORY (INHALATION) at 19:59

## 2020-02-28 RX ADMIN — IPRATROPIUM BROMIDE 0.5 MG: 0.5 SOLUTION RESPIRATORY (INHALATION) at 20:00

## 2020-02-28 NOTE — ED NOTES
TRANSFER - OUT REPORT:    Verbal report given to [facility] on Oneyda Parents  being transferred to PCU for routine progression of care       Report consisted of patients Situation, Background, Assessment and   Recommendations(SBAR). Information from the following report(s) SBAR and ED Summary was reviewed with the receiving nurse. Lines:   Peripheral IV 02/28/20 Left Antecubital (Active)   Site Assessment Clean, dry, & intact 2/28/2020 12:58 PM   Phlebitis Assessment 0 2/28/2020 12:58 PM   Infiltration Assessment 0 2/28/2020 12:58 PM   Dressing Status Clean, dry, & intact 2/28/2020 12:58 PM        Opportunity for questions and clarification was provided.       Patient transported with:   Monitor

## 2020-02-28 NOTE — ACP (ADVANCE CARE PLANNING)
Advance Care Planning Note      NAME: Clifton Voss   :  1945   MRN:  [id number]     Date/Time:  2020 4:24 PM    Active Diagnoses:  Hospital Problems  Date Reviewed: 2018          Codes Class Noted POA    Dehydration ICD-10-CM: E86.0  ICD-9-CM: 276.51  2020 Unknown        Hypotension ICD-10-CM: I95.9  ICD-9-CM: 458.9  2020 Unknown        KARMA (acute kidney injury) [facility]) ICD-10-CM: N17.9  ICD-9-CM: 584.9  2020 Unknown        Chronic systolic CHF (congestive heart failure) ([facility]) ICD-10-CM: I50.22  ICD-9-CM: 428.22, 428.0  2020 Unknown          Severe systolic cardiomyopathy  Lupus  Hypertension  COPD  CVA  Atrial fibrillation  Coronary artery disease    These active diagnoses are of sufficient risk that focused discussion on advance care planning is indicated in order to allow the patient to thoughtfully consider personal goals of care, and if situations arise that prevent the ability to personally give input, to ensure appropriate representation of their personal desires for different levels and aggressiveness of care. Discussion:   Code status addressed and wants to be a Full Code. Patient wants central line and vasopressors if needed. Patient  would like to assign Dtr Luz Maria Gifford and Shelli Bond  as the surrogate decision maker. Persons present and participating in discussion: Olvin Chauhan MD .      Time Spent:   Total time spent face-to-face in education and discussion:   16  minutes.          Olvin Chery MD   Hospitalist

## 2020-02-28 NOTE — PROGRESS NOTES
1804 TRANSFER - IN REPORT:    Verbal report received from Goldie Kaur RN (name) on Oneyda Parents  being received from ED (unit) for routine progression of care      Report consisted of patients Situation, Background, Assessment and   Recommendations(SBAR). Information from the following report(s) SBAR, Kardex, Intake/Output, MAR, Recent Results and Cardiac Rhythm Paced was reviewed with the receiving nurse. Opportunity for questions and clarification was provided. Assessment completed upon patients arrival to unit and care assumed. Primary Nurse Chanda Donald and Clarisa Santoyo., RN performed a dual skin assessment on this patient. No impairment noted. Pedrito score is 19. BP 87/56. Pt lying quietly in bed at this time. VSS. Will continue to monitor BP closely. 1815 Required admission documentation complete. 1834 BP 86/53 (61). Pt asymptomatic at this time. 1900 Bedside shift change report given to Renard [address] [place] [address] (oncoming nurse) by Alirio Bridges RN (offgoing nurse). Report included the following information SBAR, Kardex, Intake/Output, MAR, Recent Results and Cardiac Rhythm Paced.

## 2020-02-28 NOTE — H&P
Hospitalist Admission Note    NAME: Kody Walker   :  1945   MRN:  [id number]     Date/Time:  2020 4:10 PM    Patient PCP: Dottie Nunes MD  ________________________________________________________________________    My assessment of this patient's clinical condition and my plan of care is as follows. Assessment / Plan:  Hypotension  -At baseline patient reports that her blood pressure normally is borderline and she does have a history of orthostatic hypotension in the past  -She is afebrile and has no leukocytosis  -Most likely reason for hypotension could be related to her cardiomyopathy, overdiuresis and medications (Lasix, Imdur, Ranexa and nitroglycerin)  -She received a 250 bolus of normal saline in the ED. Start gentle hydration with normal saline at 50 mL/h for 12 hours and reevaluate  -We will give her 1 dose of Midodrin 10 mg p.o. x1-consider starting on scheduled Midodrin  -We will also give her 1 dose of albumin  -Closely monitor blood pressure.  -If no improvement in her blood pressure, consider Levophed      Acute kidney injury likely related to hypotension and medications (Lasix)  -Baseline creatinine is around 1.3 and currently creatinine is elevated at 2.53  -Hold all nephrotoxic medications including Lasix  -Check urinalysis with reflex culture  -Continue gentle hydration. Repeat BMP in a.m.  -She could be having cardiorenal syndrome due to advanced cardiomyopathy    Troponin elevation rule out non-STEMI  History of coronary artery disease status post cath  -EKG shows atrial paced rhythm  -First troponin is elevated 0.82  -Patient reports that she had a coronary angiogram in 2019 and was told that her vessels are too small for stenting  -Check 2 more sets of troponins  -Continue her home aspirin and statin.   No beta-blocker due to hypotension  -Check hemoglobin A1c, TSH and fasting lipid profile  -On-call cardiologist Dr. Maury Reese notified by ED and he recommended gentle hydration       Severe systolic congestive heart failure  -Per patient, her last EF is around 15%  -Check a 2D echocardiogram.  -Hold Lasix due to acute kidney injury    History of CVA  History of paroxysmal atrial fibrillation  History of COPD not in exacerbation  History of lupus  History of orthostatic hypotension  -Continue home aspirin  -Continue home inhalers  -Continue home chloroquine  -Consider starting on Midodrin if no improvement in blood pressure            Code Status: Full code  Surrogate Decision Maker: Daughter Irish Ohara prior    DVT Prophylaxis: Heparin      Baseline: From home independent        Subjective:   CHIEF COMPLAINT: Dizziness    HISTORY OF PRESENT ILLNESS:     Renee Augustin is a 66 y.o.  female who presents with a past medical history of systolic congestive heart failure, COPD, atrial fibrillation is coming to the hospital with chief complaints of dizziness and low blood pressure. Patient reports being in her usual state of health until about 1 week ago when she started feeling dizzy, lightheaded and also had generalized weakness. She went to her cardiac rehab today and was very dizzy and also noted to have low blood pressure for which she was advised to go to the emergency department for further evaluation. She reports that her symptoms are worse mostly on standing up, does not describe it as a spinning sensation. She does not report any focal weakness, tingling, numbness or facial deviation. She does not report any chest pain or shortness of breath. She reports occasional phlegm upon coughing. She does have chronic swelling of the legs which is not any worse. She does not report any orthopnea or PND. On arrival to the hospital, she was noted to have hypotension. On lab work she was noted to have normal CBC. BMP showed a creatinine of 2.53. First troponin was 0.82. BNP was elevated at 14,000. Chest x-ray shows no acute findings.     We were asked to admit for work up and evaluation of the above problems. Past Medical History:   Diagnosis Date    Arthritis     Autoimmune disease ([facility]     lupus    CAD (coronary artery disease)     Cancer ([facility]     lung cancer/ upper right lobe/     COPD     [facility]    GERD (gastroesophageal reflux disease)     Hypertension     Other ill-defined conditions(799.89)     PAD    Other ill-defined conditions(799.89)     elevated cholesterol    Thyroid disease         Past Surgical History:   Procedure Laterality Date    CARDIAC SURG PROCEDURE UNLIST      cabg x5 vessels    CHEST SURGERY PROCEDURE UNLISTED      upper right lobectomy    HX APPENDECTOMY      HX CHOLECYSTECTOMY      HX ORTHOPAEDIC      left foot club foot surgery    HX TUBAL LIGATION      VASCULAR SURGERY PROCEDURE UNLIST      right leg stent    VASCULAR SURGERY PROCEDURE UNLIST      left leg surgery for blockage,no stent       Social History     Tobacco Use    Smoking status: Former Smoker     Packs/day: 1.00     Years: 42.00     Pack years: 42.00     Last attempt to quit: 1998     Years since quittin.8    Smokeless tobacco: Never Used   Substance Use Topics    Alcohol use: No        Family history  No coronary artery disease in the family    Allergies   Allergen Reactions    Adhesive Rash    Bactrim [Sulfamethoprim Ds] Nausea Only    Cardizem [Diltiazem Hcl] Swelling     lightheadness        Prior to Admission medications    Medication Sig Start Date End Date Taking? Authorizing Provider   umeclidinium-vilanterol (ANORO ELLIPTA) 62.5-25 mcg/actuation inhaler Take 1 Puff by inhalation daily. Provider, Historical   furosemide (LASIX) 40 mg tablet Take 40 mg by mouth daily. Provider, Historical   ranolazine ER (RANEXA) 500 mg SR tablet Take  by mouth two (2) times a day. Provider, Historical   ipratropium (ATROVENT) 42 mcg (0.06 %) nasal spray 1 [place] by Both Nostrils route daily. Provider, Historical   aspirin 81 mg chewable tablet Take 1 Tab by mouth daily. 1/4/16   Laci Logan MD   nitroglycerin (NITROQUICK) 0.6 mg SL tablet 1 Tab by SubLINGual route every five (5) minutes as needed for Chest Pain. May repeat x 3 then call 911. 1/4/16   Laci Logan MD   isosorbide mononitrate ER (IMDUR) 60 mg CR tablet Take 1 Tab by mouth daily. 1/4/16   Laci Logan MD   pravastatin (PRAVACHOL) 40 mg tablet Take 40 mg by mouth nightly. Provider, Historical   tiotropium (SPIRIVA WITH HANDIHALER) 18 mcg inhalation capsule Take 1 Cap by inhalation daily. Other, MD Clint   albuterol (PROVENTIL HFA, VENTOLIN HFA) 90 mcg/actuation inhaler Take 2 Puffs by inhalation every four (4) hours as needed. Other, MD Clint   levothyroxine (SYNTHROID) 112 mcg tablet Take 112 mcg by mouth Daily (before breakfast). Provider, Historical   hydroxychloroquine (PLAQUENIL) 200 mg tablet Take 200 mg by mouth two (2) times a day. Provider, Historical       REVIEW OF SYSTEMS:     I am not able to complete the review of systems because:    The patient is intubated and sedated    The patient has altered mental status due to his acute medical problems    The patient has baseline aphasia from prior stroke(s)    The patient has baseline dementia and is not reliable historian    The patient is in acute medical distress and unable to provide information           Total of 12 systems reviewed as follows:       POSITIVE= underlined text  Negative = text not underlined  General:  fever, chills, sweats, generalized weakness, weight loss/gain,      loss of appetite   Eyes:    blurred vision, eye pain, loss of vision, double vision  ENT:    rhinorrhea, pharyngitis   Respiratory:   cough, sputum production, SOB, FORRESTER, wheezing, pleuritic pain   Cardiology:   chest pain, palpitations, orthopnea, PND, edema, syncope   Gastrointestinal:  abdominal pain , N/V, diarrhea, dysphagia, constipation, bleeding   Genitourinary: frequency, urgency, dysuria, hematuria, incontinence   Muskuloskeletal :  arthralgia, myalgia, back pain  Hematology:  easy bruising, nose or gum bleeding, lymphadenopathy   Dermatological: rash, ulceration, pruritis, color change / jaundice  Endocrine:   hot flashes or polydipsia   Neurological:  headache, dizziness, confusion, focal weakness, paresthesia,     Speech difficulties, memory loss, gait difficulty  Psychological: Feelings of anxiety, depression, agitation    Objective:   VITALS:    Visit Vitals  BP (!) 85/64   Pulse 64   Resp 18   Ht 5' 3\" (1.6 m)   Wt 55.8 kg (123 lb)   SpO2 92%   BMI 21.79 kg/m²       PHYSICAL EXAM:    General:    Alert, cooperative, no distress, appears stated age. HEENT: Atraumatic, anicteric sclerae, pink conjunctivae     No oral ulcers, mucosa moist  Neck:  Supple, symmetrical,  thyroid: non tender  Lungs:   Clear to auscultation bilaterally. No Wheezing or Rhonchi. No rales. Chest wall:  No tenderness  No Accessory muscle use. Heart:   Regular  rhythm,  No  murmur   2 +  edema  Abdomen:   Soft, non-tender. Not distended. Bowel sounds normal  Extremities: No cyanosis. No clubbing,      Skin turgor normal, Capillary refill normal, Radial dial pulse 2+  Skin:     Not pale. Not Jaundiced  No rashes   Psych:  Not anxious or agitated. Neurologic: EOMs intact. No facial asymmetry. No aphasia or slurred speech. Symmetrical strength, Sensation grossly intact.  Alert and oriented X 4.     _______________________________________________________________________  Care Plan discussed with:    Comments   Patient y    Family      RN y    Care Manager                    Consultant:      _______________________________________________________________________  Expected  Disposition:   Home with Family y   HH/PT/OT/RN    SNF/LTC    ЮЛИЯ    ________________________________________________________________________  TOTAL TIME: 61  Minutes    Critical Care Provided     Minutes non procedure based      Comments    y Reviewed previous records   >50% of visit spent in counseling and coordination of care y Discussion with patient and/or family and questions answered       ________________________________________________________________________  Signed: Jerel Marrero MD    Procedures: see electronic medical records for all procedures/Xrays and details which were not copied into this note but were reviewed prior to creation of Plan. LAB DATA REVIEWED:    Recent Results (from the past 24 hour(s))   EKG, 12 LEAD, INITIAL    Collection Time: 02/28/20 12:30 PM   Result Value Ref Range    Ventricular Rate 66 BPM    Atrial Rate 66 BPM    P-R Interval 288 ms    QRS Duration 210 ms    Q-T Interval 506 ms    QTC Calculation (Bezet) 530 ms    Calculated R Axis -95 degrees    Calculated T Axis 85 degrees    Diagnosis       Atrial-sensed ventricular-paced rhythm with prolonged AV conduction  When compared with ECG of 06-AUG-2016 19:18,  Electronic ventricular pacemaker has replaced Sinus rhythm     CBC WITH AUTOMATED DIFF    Collection Time: 02/28/20 12:59 PM   Result Value Ref Range    WBC 3.2 (L) 3.6 - 11.0 K/uL    RBC 5.06 3.80 - 5.20 M/uL    HGB 14.0 11.5 - 16.0 g/dL    HCT 42.9 35.0 - 47.0 %    MCV 84.8 80.0 - 99.0 FL    MCH 27.7 26.0 - 34.0 PG    MCHC 32.6 30.0 - 36.5 g/dL    RDW 18.0 (H) 11.5 - 14.5 %    PLATELET 155 (L) 063 - 400 K/uL    MPV 12.2 8.9 - 12.9 FL    NRBC 0.0 0  WBC    ABSOLUTE NRBC 0.00 0.00 - 0.01 K/uL    NEUTROPHILS 60 32 - 75 %    LYMPHOCYTES 28 12 - 49 %    MONOCYTES 12 5 - 13 %    EOSINOPHILS 0 0 - 7 %    BASOPHILS 1 0 - 1 %    IMMATURE GRANULOCYTES 0 0.0 - 0.5 %    ABS. NEUTROPHILS 1.9 1.8 - 8.0 K/UL    ABS. LYMPHOCYTES 0.9 0.8 - 3.5 K/UL    ABS. MONOCYTES 0.4 0.0 - 1.0 K/UL    ABS. EOSINOPHILS 0.0 0.0 - 0.4 K/UL    ABS. BASOPHILS 0.0 0.0 - 0.1 K/UL    ABS. IMM.  GRANS. 0.0 0.00 - 0.04 K/UL    DF AUTOMATED     METABOLIC PANEL, COMPREHENSIVE    Collection Time: 02/28/20 12:59 PM Result Value Ref Range    Sodium 133 (L) 136 - 145 mmol/L    Potassium 4.4 3.5 - 5.1 mmol/L    Chloride 100 97 - 108 mmol/L    CO2 27 21 - 32 mmol/L    Anion gap 6 5 - 15 mmol/L    Glucose 89 65 - 100 mg/dL    BUN 40 (H) 6 - 20 MG/DL    Creatinine 2.53 (H) 0.55 - 1.02 MG/DL    BUN/Creatinine ratio 16 12 - 20      GFR est AA 22 (L) >60 ml/min/1.73m2    GFR est non-AA 18 (L) >60 ml/min/1.73m2    Calcium 8.7 8.5 - 10.1 MG/DL    Bilirubin, total 0.6 0.2 - 1.0 MG/DL    ALT (SGPT) 38 12 - 78 U/L    AST (SGOT) 52 (H) 15 - 37 U/L    Alk.  phosphatase 97 45 - 117 U/L    Protein, total 6.8 6.4 - 8.2 g/dL    Albumin 3.4 (L) 3.5 - 5.0 g/dL    Globulin 3.4 2.0 - 4.0 g/dL    A-G Ratio 1.0 (L) 1.1 - 2.2     NT-PRO BNP    Collection Time: 02/28/20 12:59 PM   Result Value Ref Range    NT pro-BNP 14,312 (H) <450 PG/ML   TROPONIN I    Collection Time: 02/28/20 12:59 PM   Result Value Ref Range    Troponin-I, Qt. 0.82 (H) <0.05 ng/mL

## 2020-02-28 NOTE — CONSULTS
Cardiology Consult Note    CC: hypotension. Levar Sr MD  Reason for consult: Indeterminate troponin  Requesting MD:  Dr. Felecia Vasquez Date: 2/28/2020   Today's Date: 2/28/2020   Cardiologist:  Dr Jason Diamond in 2016; Dr Wagner Best since then. Cardiac Assessment/Plan:   Ischemic CM: Remote CABG; she reports cath 10/2019 @ [facility] showed \"small vessels, nothing could be done. \" Baseline EF 15-20% (9/2019, no change vs 2018). RVE with decreased RV Fxn; mild-mod MR; mod-severe TR. Primary prevention ICD: 10/2019 (Jony Sci). CKD: ?baseline  HTN  COPD  SLE  Spinal stenosis. ? PAFib: eliquis started 2016 after CVAs note on imaging. RU lobectomy for lung CA  GERD  PVD: RLE stent @ [facility] 2013. Presenting from rehab with hypotension; no CP/acute dyspnea; KARMA by Cr, indeterminate troponin. Paced ECG. Rec; not having an ACS; agree with gentle hydration; watch Cr/cycle enzymes. Hold home entresto, imdur, lasix for now; She should be on toprol Xl, unless previously intolerant. Monitor orthostatics. Hospital Problem List:  Active Problems:    Dehydration (2/28/2020)      Hypotension (2/28/2020)      KARMA (acute kidney injury) ([facility] (2/28/2020)      Chronic systolic CHF (congestive heart failure) ([facility] (2/28/2020)       ____________________________________________________________________  Malini Pyle is a 66 y.o. female presents with Hypotension [I95.9]  KARMA (acute kidney injury) ([facility] [N17.9]  Dehydration [F75.8]  Chronic systolic CHF (congestive heart failure) ([facility] [I50.22]. The patient reports no chest pain/pressure; chronic FORRESTER and LE edema that is unchanged. No PND, orthopnea, palpitations, pre-syncope, syncope. No current complaints. She reports compliance with meds/diet; no recent acute illness. She felt marked fatigue, some lightheaded earlier today at rehab; BP was low (80s) so sent to Er; SBP currently 94.     ECG: Vpacing, prob sinus  Tele: Vpacing  CXR: \"No acute findings. \"    Notable labs: Hg 14; Na 133; Cr 2.53; BUN 40; proBNP 14k; trop 0.8 (no CK). Notable prior cardiac history:  @ [facility] 8/2016:       Patient with hypertension with some orthostasis after cva, dyslipidemia, SLE, GERD, PVD with right lower ext stents Dr. Mayra Her, lung CA s/p RUL lobectomy in 1999/copd/quit tobacco follows with Dr. Katja Delgado, cholecystectomy, kidney stones, spinal stenosis, EGD/colonoscopy 2/2016 Dr. Meme Ferrara. 5V CABG in 2004. Presented with chest pain and syncope 6/2015. Cath showed patent svg to LAD, LAD with distal small vessel disease, atretic LIMA, LCX is codominant and has two subtotalled OM's distal LCx has 90% stenosis prior to another OM, RCA occluded. Post cath found to have multiple CVA, concern for embolic, now on eliquis, echo 6/2015 EF 40%, carotid with < 50% bilaterally, sinus with RBBB/LAHB. Small nstemi 1/2016 treated medically. Hypertensive urgency 8/2016. Limited functional capacity, currently exercising at home every day, walking at mall. Feeling better with titration of medications. No chest pain. Stable dyspnea. No chest discomfort suggestive of ischemia. Denies orthopnea, PND, or edema. No palpitations, syncope, near syncope. No other complaints. IMPRESSION AND PLAN  01. Atherosclerotic heart disease of native coronary artery with other forms of angina pectoris:  Severe branch vessel disease. Class II angina. Small NSTEMI 1/2016 managed medically. Angina in setting of hypertensive urgency in 8/2016. Stable class II symptoms again. ECG done today  We will continue the current medical therapy. 02. Ischemic cardiomyopathy:  On beta blocker and ARB. 03. Bifascicular block:  Found to have CVA, being treated for possible proxysmal afib empirically, never documented. 04. Essential (primary) hypertension:  Adequately controlled. Better with addition of amlodapine and increase of metoprolol. 05.  Mixed hyperlipidemia:  Crestor to expensive, did not tolerate atorvastatin, doing ok with pravastatin. 06. Personal history of nicotine dependence:  remains abstinent from tobacco use. 07. Long term (current) use of anticoagulants: This condition is stable. 08. Long term (current) use of aspirin:  This condition is stable. 09. Body mass index (BMI) 25.0-25.9, adult   10.  Body mass index (BMI) 23.0-23.9, adult         ORDERS:  1 ECG done today   2 Return office visit with Enrique Valenzuela MD in 6 Months.     8/2016 MEDICATION LIST    Medication Sig Description   albuterol sulfate HFA 90 mcg/actuation aerosol inhaler inhale 2 puff by inhalation route  every 4 - 6 hours as needed   aspirin 81 mg tablet,delayed release take 1 tablet by oral route  every day   Eliquis 5 mg tablet take 1 tablet by oral route 2 times every day   hydrochlorothiazide 12.5 mg tablet take 1 tablet by oral route  every day   isosorbide mononitrate ER 60 mg tablet,extended release 24 hr take 1 tablet by oral route  every day in the morning   losartan 100 mg tablet take 1 tablet by oral route  every day   metoprolol tartrate 50 mg tablet take 1 tablet by oral route 2 times every day with meals   nitroglycerin 0.4 mg sublingual tablet place 1 tablet by sublingual route at the 1st sign of attack; may repeat every 5 min until relief; if pain persists after 3 tablets in 15 min, prompt medical attention is recommended   Norvasc 5 mg tablet take 1 tablet by oral route  every morning   pantoprazole 40 mg tablet,delayed release take 1 tablet by oral route  every day   Plaquenil 200 mg tablet take 1 tablet by oral route 2 times every day   pravastatin 40 mg tablet take 1 tablet by oral route  every day   Spiriva with HandiHaler 18 mcg & inhalation capsules inhale 1 capsule by inhalation route  every day   Synthroid 88 mcg tablet take 1 tablet by oral route  every day   Vitamin D3 1,000 unit tablet 1 po qd   ______________________________________________________________________________  CARDIAC HISTORY  RISK FACTORS:  1 Peripheral Vascular Disease   2 Hypertension   3 Dyslipidemia       CARDIOVASCULAR PROCEDURES  CATH LAB:  Cath (Patent svg to LAD, ?lima to diag, atretic.   native lCx with distal stenosis, RCA occluded in mid portion) - 2015   CV SURGERY:  CV Surgery (CABG) -    Vasc Surgery (Stents in legs [address]) - 3/2012   ECHO/MUGA:  Echo (EF 0.40 (40%)) - 6/15/2015   ELECTROPHYSIOLOGY:  EKG (Sinus Rhythm, Hr 58, PAC,  RBBB, LAHB) - 2016   STRESS TESTS:  [place] MPI (EF 0.74, Small area of inferior ischemia. Unchanged from stress tests dating back to 10/2008 and consistant with patients known occluded SVG to RCA. ) - 8/15/2013   VASCULAR:  Carotid Duplex (Less than 50% Bilateral ICAs) - 2015       ALLERGIES/INTOLERANCES:    Ingredient Reaction Medication Name Comment   ATORVASTATIN leg cramps     SULFAMETHOXAZOLE Nausea Bactrim    ATORVASTATIN CALCIUM muscle aches Lipitor    TRIMETHOPRIM  Bactrim    ADHESIVE TAPE rash         PROBLEM LIST:    Problem Description Chronic   PVD Y   CAD Y   Benign essential HTN N         PAST MEDICAL/SURGICAL HISTORY  (Detailed)    Disease/disorder Onset Date Management Date Comments     Appendectomy       Cholecystectomy     CABG       COPD       degenerative disc disease       GERD       kidney stones       lung surgery for cancer       lupus       Osteoarthritis       pneumonectomy       PVD       spinal stenosis       stent in leg    [address] [facility]   stroke       Thyroid disease             Family History  (Detailed)    Relationship Family Member Name  Age at Death Condition Onset Age Cause of Death   Maternal aunt    Myocardial infarction  N   SOCIAL HISTORY  (Detailed)  Preferred language is English. EDUCATION/EMPLOYMENT/OCCUPATION    Employment History Status Retired Restrictions     retired                          MARITAL STATUS/FAMILY/SOCIAL SUPPORT  Currently .     SMOKING STATUS  Use Status Type Smoking Status Usage Per Day Years Used Total Pack Years   yes  Former smoker            ______________________________________________________________________  Patient Active Problem List    Diagnosis Date Noted    Dehydration 02/28/2020    Hypotension 02/28/2020    [facility] 02/28/2020    Chronic systolic CHF (congestive heart failure) ([facility] 02/28/2020    Chest pain 08/06/2016    NSTEMI (non-ST elevated myocardial infarction) ([facility] 01/02/2016    Orthostatic hypotension 06/17/2015    CVA (cerebral infarction) 06/17/2015    CAD (coronary artery disease) 06/17/2015    Syncope and collapse 06/16/2015    Respiratory arrest ([facility] 06/14/2015        Past Medical History:   Diagnosis Date    Arthritis     Autoimmune disease ([facility]     lupus    CAD (coronary artery disease)     Cancer ([facility]     lung cancer/ upper right lobe/ 1998    COPD     CVA (cerebral vascular accident) ([facility] June 2015    GERD (gastroesophageal reflux disease)     Hypertension     Other ill-defined conditions(799.89)     PAD    Other ill-defined conditions(799.89)     elevated cholesterol    Thyroid disease       Past Surgical History:   Procedure Laterality Date    CARDIAC SURG PROCEDURE UNLIST      cabg x5 vessels    CHEST SURGERY PROCEDURE UNLISTED      upper right lobectomy    HX APPENDECTOMY      HX CHOLECYSTECTOMY      HX ORTHOPAEDIC      left foot club foot surgery    HX TUBAL LIGATION      VASCULAR SURGERY PROCEDURE UNLIST      right leg stent    VASCULAR SURGERY PROCEDURE UNLIST      left leg surgery for blockage,no stent     Allergies   Allergen Reactions    Adhesive Rash    Bactrim [Sulfamethoprim Ds] Nausea Only    Cardizem [Diltiazem Hcl] Swelling     lightheadness      History reviewed. No pertinent family history.    Social History     Socioeconomic History    Marital status:      Spouse name: Not on file    Number of children: Not on file    Years of education: Not on file    Highest education level: Not on file   Occupational History    Not on file   Social Needs    Financial resource strain: Not on file    Food insecurity:     Worry: Not on file     Inability: Not on file    Transportation needs:     Medical: Not on file     Non-medical: Not on file   Tobacco Use    Smoking status: Former Smoker     Packs/day: 1.00     Years: 42.00     Pack years: 42.00     Last attempt to quit: 1998     Years since quittin.8    Smokeless tobacco: Never Used   Substance and Sexual Activity    Alcohol use: No    Drug use: No    Sexual activity: Not on file   Lifestyle    Physical activity:     Days per week: Not on file     Minutes per session: Not on file    Stress: Not on file   Relationships    Social connections:     Talks on phone: Not on file     Gets together: Not on file     Attends Church service: Not on file     Active member of club or organization: Not on file     Attends meetings of clubs or organizations: Not on file     Relationship status: Not on file    Intimate partner violence:     Fear of current or ex partner: Not on file     Emotionally abused: Not on file     Physically abused: Not on file     Forced sexual activity: Not on file   Other Topics Concern    Not on file   Social History Narrative    Not on file     Current Facility-Administered Medications   Medication Dose Route Frequency    acetaminophen (TYLENOL) tablet 650 mg  650 mg Oral Q6H PRN     Current Outpatient Medications   Medication Sig    umeclidinium-vilanterol (ANORO ELLIPTA) 62.5-25 mcg/actuation inhaler Take 1 Puff by inhalation daily.  furosemide (LASIX) 40 mg tablet Take 40 mg by mouth daily.  ranolazine ER (RANEXA) 500 mg SR tablet Take  by mouth two (2) times a day.  ipratropium (ATROVENT) 42 mcg (0.06 %) nasal spray 1 [place] by Both Nostrils route daily.  aspirin 81 mg chewable tablet Take 1 Tab by mouth daily.     nitroglycerin (NITROQUICK) 0.6 mg SL tablet 1 Tab by SubLINGual route every five (5) minutes as needed for Chest Pain. May repeat x 3 then call 911.  isosorbide mononitrate ER (IMDUR) 60 mg CR tablet Take 1 Tab by mouth daily.  pravastatin (PRAVACHOL) 40 mg tablet Take 40 mg by mouth nightly.  tiotropium (SPIRIVA WITH HANDIHALER) 18 mcg inhalation capsule Take 1 Cap by inhalation daily.  albuterol (PROVENTIL HFA, VENTOLIN HFA) 90 mcg/actuation inhaler Take 2 Puffs by inhalation every four (4) hours as needed.  levothyroxine (SYNTHROID) 112 mcg tablet Take 112 mcg by mouth Daily (before breakfast).  hydroxychloroquine (PLAQUENIL) 200 mg tablet Take 200 mg by mouth two (2) times a day. Prior to Admission Medications:  Prior to Admission medications    Medication Sig Start Date End Date Taking? Authorizing Provider   umeclidinium-vilanterol (ANORO ELLIPTA) 62.5-25 mcg/actuation inhaler Take 1 Puff by inhalation daily. Provider, Historical   furosemide (LASIX) 40 mg tablet Take 40 mg by mouth daily. Provider, Historical   ranolazine ER (RANEXA) 500 mg SR tablet Take  by mouth two (2) times a day. Provider, Historical   ipratropium (ATROVENT) 42 mcg (0.06 %) nasal spray 1 [place] by Both Nostrils route daily. Provider, Historical   aspirin 81 mg chewable tablet Take 1 Tab by mouth daily. 1/4/16   Rocio Stokes MD   nitroglycerin (NITROQUICK) 0.6 mg SL tablet 1 Tab by SubLINGual route every five (5) minutes as needed for Chest Pain. May repeat x 3 then call 911. 1/4/16   Rocio Stokes MD   isosorbide mononitrate ER (IMDUR) 60 mg CR tablet Take 1 Tab by mouth daily. 1/4/16   Rocio Stokes MD   pravastatin (PRAVACHOL) 40 mg tablet Take 40 mg by mouth nightly. Provider, Historical   tiotropium (SPIRIVA WITH HANDIHALER) 18 mcg inhalation capsule Take 1 Cap by inhalation daily.     Other, MD Clint   albuterol (PROVENTIL HFA, VENTOLIN HFA) 90 mcg/actuation inhaler Take 2 Puffs by inhalation every four (4) hours as needed. Other, MD Clint   levothyroxine (SYNTHROID) 112 mcg tablet Take 112 mcg by mouth Daily (before breakfast). Provider, Historical   hydroxychloroquine (PLAQUENIL) 200 mg tablet Take 200 mg by mouth two (2) times a day. Provider, Historical        Review of Symptoms: Except as noted in HPI, patient denies recent fever or chills, nausea, vomiting, diarrhea, hemoptysis, hematemesis, dysuria, myalgias, focal neurologic symptoms, ecchymosis, angioedema, odynophagia, dysphagia, sore throat, earache,rash, melena, hematochezia, depression, GERD, cold intolerance, petechia, bleeding gums, or significant weight loss. 24 hr VS reviewed, overall VSSAF  No data recorded. Patient Vitals for the past 8 hrs:   Pulse   02/28/20 1515 64   02/28/20 1453 64   02/28/20 1445 64   02/28/20 1430 61   02/28/20 1415 61   02/28/20 1227 61     Patient Vitals for the past 8 hrs:   Resp   02/28/20 1227 18     Patient Vitals for the past 8 hrs:   BP   02/28/20 1515 (!) 85/64   02/28/20 1453 (!) 80/61   02/28/20 1445 (!) 89/58   02/28/20 1430 (!) 81/62   02/28/20 1415 (!) 88/65   02/28/20 1227 90/58     No intake or output data in the 24 hours ending 02/28/20 1600      Physical Exam (complete single organ system exam)    Cons: The patient is no distress. Appears stated age. HEENT: Normal conjunctivae and palate. No xanthelasma. Neck: Flat JVP without appreciable HJR. Resp: Normal respiratory effort with clear lungs bilaterally. CV: Regular rate and rhythm. PMI not palpated. Normal S1,S2  No gallop or rubs appreciated. Soft holosystolic murmur appreciated. Intact carotid upstroke bilaterally without appreciated bruits. Abdominal aorta not palpated; no abdominal bruit noted. Decreased pedal pulses. Mild peripheral edema. GI: No abd mass noted, soft; no organomegaly noted. Bowel sounds present. Muscular:  No significant kyphosis. Strength WNL for age.   Ext: No cyanosis, clubbing, or stigmata of peripheral embolization. Derm: No ulcers or stasis dermatitis of lower extremities. Neuro: Alert and oriented x 3;  Grossly non-focal. Normal mood and affect. Labs:   Recent Results (from the past 24 hour(s))   EKG, 12 LEAD, INITIAL    Collection Time: 02/28/20 12:30 PM   Result Value Ref Range    Ventricular Rate 66 BPM    Atrial Rate 66 BPM    P-R Interval 288 ms    QRS Duration 210 ms    Q-T Interval 506 ms    QTC Calculation (Bezet) 530 ms    Calculated R Axis -95 degrees    Calculated T Axis 85 degrees    Diagnosis       Atrial-sensed ventricular-paced rhythm with prolonged AV conduction  When compared with ECG of 06-AUG-2016 19:18,  Electronic ventricular pacemaker has replaced Sinus rhythm     CBC WITH AUTOMATED DIFF    Collection Time: 02/28/20 12:59 PM   Result Value Ref Range    WBC 3.2 (L) 3.6 - 11.0 K/uL    RBC 5.06 3.80 - 5.20 M/uL    HGB 14.0 11.5 - 16.0 g/dL    HCT 42.9 35.0 - 47.0 %    MCV 84.8 80.0 - 99.0 FL    MCH 27.7 26.0 - 34.0 PG    MCHC 32.6 30.0 - 36.5 g/dL    RDW 18.0 (H) 11.5 - 14.5 %    PLATELET 848 (L) 997 - 400 K/uL    MPV 12.2 8.9 - 12.9 FL    NRBC 0.0 0  WBC    ABSOLUTE NRBC 0.00 0.00 - 0.01 K/uL    NEUTROPHILS 60 32 - 75 %    LYMPHOCYTES 28 12 - 49 %    MONOCYTES 12 5 - 13 %    EOSINOPHILS 0 0 - 7 %    BASOPHILS 1 0 - 1 %    IMMATURE GRANULOCYTES 0 0.0 - 0.5 %    ABS. NEUTROPHILS 1.9 1.8 - 8.0 K/UL    ABS. LYMPHOCYTES 0.9 0.8 - 3.5 K/UL    ABS. MONOCYTES 0.4 0.0 - 1.0 K/UL    ABS. EOSINOPHILS 0.0 0.0 - 0.4 K/UL    ABS. BASOPHILS 0.0 0.0 - 0.1 K/UL    ABS. IMM.  GRANS. 0.0 0.00 - 0.04 K/UL    DF AUTOMATED     METABOLIC PANEL, COMPREHENSIVE    Collection Time: 02/28/20 12:59 PM   Result Value Ref Range    Sodium 133 (L) 136 - 145 mmol/L    Potassium 4.4 3.5 - 5.1 mmol/L    Chloride 100 97 - 108 mmol/L    CO2 27 21 - 32 mmol/L    Anion gap 6 5 - 15 mmol/L    Glucose 89 65 - 100 mg/dL    BUN 40 (H) 6 - 20 MG/DL    Creatinine 2.53 (H) 0.55 - 1.02 MG/DL    BUN/Creatinine ratio 16 12 - 20 GFR est AA 22 (L) >60 ml/min/1.73m2    GFR est non-AA 18 (L) >60 ml/min/1.73m2    Calcium 8.7 8.5 - 10.1 MG/DL    Bilirubin, total 0.6 0.2 - 1.0 MG/DL    ALT (SGPT) 38 12 - 78 U/L    AST (SGOT) 52 (H) 15 - 37 U/L    Alk.  phosphatase 97 45 - 117 U/L    Protein, total 6.8 6.4 - 8.2 g/dL    Albumin 3.4 (L) 3.5 - 5.0 g/dL    Globulin 3.4 2.0 - 4.0 g/dL    A-G Ratio 1.0 (L) 1.1 - 2.2     NT-PRO BNP    Collection Time: 02/28/20 12:59 PM   Result Value Ref Range    NT pro-BNP 14,312 (H) <450 PG/ML   TROPONIN I    Collection Time: 02/28/20 12:59 PM   Result Value Ref Range    Troponin-I, Qt. 0.82 (H) <0.05 ng/mL     Recent Labs     02/28/20  1259   TROIQ 0.82*       Recent Labs     02/28/20  1259   *   K 4.4      CO2 27   BUN 40*   CREA 2.53*   GFRAA 22*   GLU 89   CA 8.7   ALB 3.4*   WBC 3.2*   HGB 14.0   HCT 42.9   *       Ella John MD

## 2020-02-28 NOTE — ED PROVIDER NOTES
EMERGENCY DEPARTMENT HISTORY AND PHYSICAL EXAM      Date: 2/28/2020  Patient Name: Duane Duong    History of Presenting Illness     Chief Complaint   Patient presents with    Fatigue     sent from cardiac rehab for hypotension and c/o feeling weak for today; denies pain. HX of CHF Ef 15%       History Provided By: Patient    HPI: Duane Duong, 66 y.o. female presents to the ED with cc of fatigue, hypotension. Pt states she has prior cardiac hx with MI that was medically treated versus stent as they were unable to intervene during her PTCA. Pt followed by Dr Keagan Benavides in [facility]. Pt does take diuretic and there has been no recent FORRESTER or orthopnea. She denies CP/ abd pain, n/v/d. She denies any melena or hematochezia. Pt had went to Cardiac Rehab and she complained of feeling lightheaded. BP checked and she was noted to be hypotensive. There are no other complaints, changes, or physical findings at this time. PCP: Arlene Olivarez MD    No current facility-administered medications on file prior to encounter. Current Outpatient Medications on File Prior to Encounter   Medication Sig Dispense Refill    umeclidinium-vilanterol (ANORO ELLIPTA) 62.5-25 mcg/actuation inhaler Take 1 Puff by inhalation daily.  furosemide (LASIX) 40 mg tablet Take 40 mg by mouth daily.  ranolazine ER (RANEXA) 500 mg SR tablet Take  by mouth two (2) times a day.  ipratropium (ATROVENT) 42 mcg (0.06 %) nasal spray 1 Delight by Both Nostrils route daily.  aspirin 81 mg chewable tablet Take 1 Tab by mouth daily. 30 Tab 11    nitroglycerin (NITROQUICK) 0.6 mg SL tablet 1 Tab by SubLINGual route every five (5) minutes as needed for Chest Pain. May repeat x 3 then call 911. 1 Bottle 11    isosorbide mononitrate ER (IMDUR) 60 mg CR tablet Take 1 Tab by mouth daily. 30 Tab 11    pravastatin (PRAVACHOL) 40 mg tablet Take 40 mg by mouth nightly.       tiotropium (SPIRIVA WITH HANDIHALER) 18 mcg inhalation capsule Take 1 Cap by inhalation daily.  albuterol (PROVENTIL HFA, VENTOLIN HFA) 90 mcg/actuation inhaler Take 2 Puffs by inhalation every four (4) hours as needed.  levothyroxine (SYNTHROID) 112 mcg tablet Take 112 mcg by mouth Daily (before breakfast).  hydroxychloroquine (PLAQUENIL) 200 mg tablet Take 200 mg by mouth two (2) times a day. Past History     Past Medical History:  Past Medical History:   Diagnosis Date    Arthritis     Autoimmune disease ([facility] [address]     lupus    CAD (coronary artery disease)     Cancer ([facility] [address]     lung cancer/ upper right lobe/     COPD     [facility]    GERD (gastroesophageal reflux disease)     Hypertension     Other ill-defined conditions(799.89)     PAD    Other ill-defined conditions(799.89)     elevated cholesterol    Thyroid disease        Past Surgical History:  Past Surgical History:   Procedure Laterality Date    CARDIAC SURG PROCEDURE UNLIST      cabg x5 vessels    CHEST SURGERY PROCEDURE UNLISTED      upper right lobectomy    HX APPENDECTOMY      HX CHOLECYSTECTOMY      HX ORTHOPAEDIC      left foot club foot surgery    HX TUBAL LIGATION      VASCULAR SURGERY PROCEDURE UNLIST      right leg stent    VASCULAR SURGERY PROCEDURE UNLIST      left leg surgery for blockage,no stent       Family History:  No family history on file. Social History:  Social History     Tobacco Use    Smoking status: Former Smoker     Packs/day: 1.00     Years: 42.00     Pack years: 42.00     Last attempt to quit: 1998     Years since quittin.8    Smokeless tobacco: Never Used   Substance Use Topics    Alcohol use: No    Drug use: No       Allergies: Allergies   Allergen Reactions    Adhesive Rash    Bactrim [Sulfamethoprim Ds] Nausea Only    Cardizem [Diltiazem Hcl] Swelling     lightheadness         Review of Systems   Review of Systems   Constitutional: Positive for fatigue.  Negative for appetite change, chills and fever. HENT: Negative. Negative for congestion, rhinorrhea, sinus pressure and sore throat. Eyes: Negative. Respiratory: Negative. Negative for cough, choking, chest tightness, shortness of breath and wheezing. Cardiovascular: Negative. Negative for chest pain, palpitations and leg swelling. Pacemaker, Hx of EF 15%   Gastrointestinal: Negative for abdominal pain, constipation, diarrhea, nausea and vomiting. Endocrine: Negative. Genitourinary: Negative. Negative for difficulty urinating, dysuria, flank pain and urgency. Musculoskeletal: Negative. Skin: Negative. Neurological: Positive for light-headedness. Negative for dizziness, speech difficulty, weakness, numbness and headaches. Psychiatric/Behavioral: Negative. All other systems reviewed and are negative. Physical Exam   Physical Exam  Vitals signs and nursing note reviewed. Constitutional:       General: She is not in acute distress. Appearance: She is well-developed. She is not diaphoretic. HENT:      Head: Normocephalic and atraumatic. Mouth/Throat:      Mouth: Mucous membranes are dry. Pharynx: No oropharyngeal exudate. Eyes:      Extraocular Movements: Extraocular movements intact. Conjunctiva/sclera: Conjunctivae normal.      Pupils: Pupils are equal, round, and reactive to light. Neck:      Musculoskeletal: Normal range of motion and neck supple. Vascular: No JVD. Trachea: No tracheal deviation. Cardiovascular:      Rate and Rhythm: Normal rate and regular rhythm. Heart sounds: Normal heart sounds. No murmur. Pulmonary:      Effort: Pulmonary effort is normal. No respiratory distress. Breath sounds: Normal breath sounds. No stridor. No wheezing or rales. Comments: Pacemaker    Abdominal:      General: There is no distension. Palpations: Abdomen is soft. Tenderness: There is no abdominal tenderness. There is no guarding or rebound. Musculoskeletal: Normal range of motion. General: No tenderness. Skin:     General: Skin is warm and dry. Capillary Refill: Capillary refill takes less than 2 seconds. Neurological:      Mental Status: She is alert and oriented to person, place, and time. Cranial Nerves: No cranial nerve deficit. Comments: No gross motor or sensory deficits    Psychiatric:         Mood and Affect: Mood normal.         Behavior: Behavior normal.         Diagnostic Study Results     Labs -     Recent Results (from the past 12 hour(s))   EKG, 12 LEAD, INITIAL    Collection Time: 02/28/20 12:30 PM   Result Value Ref Range    Ventricular Rate 66 BPM    Atrial Rate 66 BPM    P-R Interval 288 ms    QRS Duration 210 ms    Q-T Interval 506 ms    QTC Calculation (Bezet) 530 ms    Calculated R Axis -95 degrees    Calculated T Axis 85 degrees    Diagnosis       Atrial-sensed ventricular-paced rhythm with prolonged AV conduction  When compared with ECG of 06-AUG-2016 19:18,  Electronic ventricular pacemaker has replaced Sinus rhythm  Confirmed by Sandro Paige ([id number]) on 2/28/2020 5:01:39 PM     CBC WITH AUTOMATED DIFF    Collection Time: 02/28/20 12:59 PM   Result Value Ref Range    WBC 3.2 (L) 3.6 - 11.0 K/uL    RBC 5.06 3.80 - 5.20 M/uL    HGB 14.0 11.5 - 16.0 g/dL    HCT 42.9 35.0 - 47.0 %    MCV 84.8 80.0 - 99.0 FL    MCH 27.7 26.0 - 34.0 PG    MCHC 32.6 30.0 - 36.5 g/dL    RDW 18.0 (H) 11.5 - 14.5 %    PLATELET 940 (L) 618 - 400 K/uL    MPV 12.2 8.9 - 12.9 FL    NRBC 0.0 0  WBC    ABSOLUTE NRBC 0.00 0.00 - 0.01 K/uL    NEUTROPHILS 60 32 - 75 %    LYMPHOCYTES 28 12 - 49 %    MONOCYTES 12 5 - 13 %    EOSINOPHILS 0 0 - 7 %    BASOPHILS 1 0 - 1 %    IMMATURE GRANULOCYTES 0 0.0 - 0.5 %    ABS. NEUTROPHILS 1.9 1.8 - 8.0 K/UL    ABS. LYMPHOCYTES 0.9 0.8 - 3.5 K/UL    ABS. MONOCYTES 0.4 0.0 - 1.0 K/UL    ABS. EOSINOPHILS 0.0 0.0 - 0.4 K/UL    ABS. BASOPHILS 0.0 0.0 - 0.1 K/UL    ABS. IMM.  GRANS. 0.0 0.00 - 0.04 K/UL    DF AUTOMATED     METABOLIC PANEL, COMPREHENSIVE    Collection Time: 02/28/20 12:59 PM   Result Value Ref Range    Sodium 133 (L) 136 - 145 mmol/L    Potassium 4.4 3.5 - 5.1 mmol/L    Chloride 100 97 - 108 mmol/L    CO2 27 21 - 32 mmol/L    Anion gap 6 5 - 15 mmol/L    Glucose 89 65 - 100 mg/dL    BUN 40 (H) 6 - 20 MG/DL    Creatinine 2.53 (H) 0.55 - 1.02 MG/DL    BUN/Creatinine ratio 16 12 - 20      GFR est AA 22 (L) >60 ml/min/1.73m2    GFR est non-AA 18 (L) >60 ml/min/1.73m2    Calcium 8.7 8.5 - 10.1 MG/DL    Bilirubin, total 0.6 0.2 - 1.0 MG/DL    ALT (SGPT) 38 12 - 78 U/L    AST (SGOT) 52 (H) 15 - 37 U/L    Alk.  phosphatase 97 45 - 117 U/L    Protein, total 6.8 6.4 - 8.2 g/dL    Albumin 3.4 (L) 3.5 - 5.0 g/dL    Globulin 3.4 2.0 - 4.0 g/dL    A-G Ratio 1.0 (L) 1.1 - 2.2     NT-PRO BNP    Collection Time: 02/28/20 12:59 PM   Result Value Ref Range    NT pro-BNP 14,312 (H) <450 PG/ML   TROPONIN I    Collection Time: 02/28/20 12:59 PM   Result Value Ref Range    Troponin-I, Qt. 0.82 (H) <0.05 ng/mL   URINALYSIS W/ REFLEX CULTURE    Collection Time: 02/28/20  5:11 PM   Result Value Ref Range    Color STEW      Appearance CLOUDY (A) CLEAR      Specific gravity 1.022 1.003 - 1.030      pH (UA) 5.0 5.0 - 8.0      Protein 300 (A) NEG mg/dL    Glucose NEGATIVE  NEG mg/dL    Ketone TRACE (A) NEG mg/dL    Blood NEGATIVE  NEG      Urobilinogen 1.0 0.2 - 1.0 EU/dL    Nitrites POSITIVE (A) NEG      Leukocyte Esterase SMALL (A) NEG      WBC 10-20 /hpf    RBC 5-10 /hpf    Epithelial cells FEW /lpf    Bacteria 2+ /hpf    UA:UC IF INDICATED URINE CULTURE ORDERED      WBC cast 0-2 /lpf    Budding yeast PRESENT     BILIRUBIN, CONFIRM    Collection Time: 02/28/20  5:11 PM   Result Value Ref Range    Bilirubin UA, confirm NEGATIVE      CK W/ REFLX CKMB    Collection Time: 02/28/20  8:10 PM   Result Value Ref Range     26 - 192 U/L   TROPONIN I    Collection Time: 02/28/20  8:10 PM   Result Value Ref Range Troponin-I, Qt. 0.66 (H) <0.05 ng/mL       Radiologic Studies -   XR CHEST PORT   Final Result   IMPRESSION: No acute findings. CT Results  (Last 48 hours)    None        CXR Results  (Last 48 hours)    None          Medical Decision Making   I am the first provider for this patient. I reviewed the vital signs, available nursing notes, past medical history, past surgical history, family history and social history. Vital Signs-Reviewed the patient's vital signs. Patient Vitals for the past 12 hrs:   Pulse Resp BP   02/28/20 1227 61 18 90/58       EKG interpretation: (Preliminary)  Paced, rate 66, no acute changes. Jung Canada DO    Records Reviewed: Nursing Notes, Old Medical Records, Previous electrocardiograms, Previous Radiology Studies and Previous Laboratory Studies    Provider Notes (Medical Decision Making):   DDx- Adverse effect of medicine, dehydration, electrolyte abnormality, ACS,     ED Course:   Initial assessment performed. The patients presenting problems have been discussed, and they are in agreement with the care plan formulated and outlined with them. I have encouraged them to ask questions as they arise throughout their visit. Pt with hx of CAD w/ CHF, EF 15%. Pt has been on diuretic. Pt exam she clinically is dehydrated. Pt given small fluid bolus of 250ml. Following initial bolus pt still hypotension. Order additional fluids. Labs- trop elevated, with Acute renal failure. ASA ordered, given elevate troponin. Pt with ARF, likely due to dehydration. Will proceed with fluids but conservative as to not cause pulmonary edema. Will consult Cards and Medicine. Consult:  Case discussed with RN with Dr. Ciara Henley, last Cr in Oct 1.2, pt had cath, no intervention, medically managed. Will send most recent records. Consult Note:  Case discussed with Dr. Rosalio Barbour will see in consult, elev Trop likely related to dehydration and hypotension.      Consult:  Case discussed with Dr. Marva Meigs, will see and evaluate for admission. Critical Care Time:   CRITICAL CARE NOTE :    IMPENDING DETERIORATION -Cardiovascular, Metabolic and Renal  ASSOCIATED RISK FACTORS - Hypotension, Metabolic changes and Dehydration  MANAGEMENT- Bedside Assessment and Supervision of Care  INTERPRETATION -  Xrays, ECG, Blood Pressure, Cardiac Output Measures  and Labs  INTERVENTIONS - hemodynamic mngmt and Metobolic interventions  CASE REVIEW - Hospitalist, Medical Sub-Specialist and Nursing  TREATMENT RESPONSE -Stable  PERFORMED BY - Self    NOTES   :  I have spent 40 minutes of critical care time involved in lab review, consultations with specialist, family decision- making, bedside attention and documentation. During this entire length of time I was immediately available to the patient . Nohelia Soto DO    Disposition:  Admit- hydration     PLAN:  1. Admit      Diagnosis     Clinical Impression:   1. Acute renal failure, unspecified acute renal failure type ([address]    2. Hypotension, unspecified hypotension type    3. Dehydration        Attestations:    Nohelia Soto DO    Please note that this dictation was completed with [employer], the computer voice recognition software. Quite often unanticipated grammatical, syntax, homophones, and other interpretive errors are inadvertently transcribed by the computer software. Please disregard these errors. Please excuse any errors that have escaped final proofreading. Thank you.

## 2020-02-28 NOTE — PROGRESS NOTES
Pharmacy Medication Reconciliation     The patient was interviewed regarding current PTA medication list, use and drug allergies; The patient was questioned regarding use of any other inhalers, topical products, over the counter medications, herbal medications, vitamin products or ophthalmic/nasal/otic medication use. Allergy Update: Adhesive; Bactrim [sulfamethoprim ds]; and Cardizem [diltiazem hcl]    Recommendations/Findings: The following amendments were made to the patient's active medication list on file at [facility]:   1) Additions:   sacubitril-valsartan (ENTRESTO) 97 mg/103 mg tablet  denosumab (PROLIA) 60 mg/mL injection    2) Deletions: NONE    3) Changes: NONE      Pertinent Findings: NONE    -Clarified PTA med list with RxQuery, Patient interview. PTA medication list was corrected to the following:     Prior to Admission Medications   Prescriptions Last Dose Informant Taking? albuterol (PROVENTIL HFA, VENTOLIN HFA) 90 mcg/actuation inhaler 2020 at Unknown time Self Yes   Sig: Take 2 Puffs by inhalation every four (4) hours as needed. aspirin 81 mg chewable tablet 2020 at Unknown time Self Yes   Sig: Take 1 Tab by mouth daily. denosumab (PROLIA) 60 mg/mL injection 2020 at Unknown time  Yes   Si mg by SubCUTAneous route once. Every 6 months. Patient says she is due for another one soon. furosemide (LASIX) 40 mg tablet 2020 at Unknown time Self Yes   Sig: Take 40 mg by mouth daily. hydroxychloroquine (PLAQUENIL) 200 mg tablet 2020 at Unknown time Self Yes   Sig: Take 200 mg by mouth two (2) times a day. ipratropium (ATROVENT) 42 mcg (0.06 %) nasal spray 2020 at Unknown time Self Yes   Si Sprays by Both Nostrils route daily. isosorbide mononitrate ER (IMDUR) 60 mg CR tablet 2020 at Unknown time Self Yes   Sig: Take 1 Tab by mouth daily.    levothyroxine (SYNTHROID) 112 mcg tablet 2020 at Unknown time Self Yes   Sig: Take 112 mcg by mouth Daily (before breakfast). pravastatin (PRAVACHOL) 40 mg tablet 2/27/2020 at Unknown time Self Yes   Sig: Take 40 mg by mouth nightly. ranolazine ER (RANEXA) 500 mg SR tablet 2/28/2020 at Unknown time Self Yes   Sig: Take 500 mg by mouth two (2) times a day. sacubitril-valsartan (ENTRESTO) 97 mg/103 mg tablet 2/28/2020 at Unknown time  Yes   Sig: Take 1 Tab by mouth two (2) times a day. umeclidinium-vilanterol (ANORO ELLIPTA) 62.5-25 mcg/actuation inhaler 2/28/2020 at Unknown time Self Yes   Sig: Take 1 Puff by inhalation daily.       Facility-Administered Medications: None          Thank you,  Real Belcher Second  PharmD Candidate [employer]

## 2020-02-29 LAB
ANION GAP SERPL CALC-SCNC: 6 MMOL/L (ref 5–15)
BASOPHILS # BLD: 0 K/UL (ref 0–0.1)
BASOPHILS NFR BLD: 1 % (ref 0–1)
BUN SERPL-MCNC: 45 MG/DL (ref 6–20)
BUN/CREAT SERPL: 18 (ref 12–20)
CALCIUM SERPL-MCNC: 8.5 MG/DL (ref 8.5–10.1)
CHLORIDE SERPL-SCNC: 105 MMOL/L (ref 97–108)
CHOLEST SERPL-MCNC: 129 MG/DL
CK SERPL-CCNC: 158 U/L (ref 26–192)
CO2 SERPL-SCNC: 25 MMOL/L (ref 21–32)
CREAT SERPL-MCNC: 2.46 MG/DL (ref 0.55–1.02)
DIFFERENTIAL METHOD BLD: ABNORMAL
EOSINOPHIL # BLD: 0 K/UL (ref 0–0.4)
EOSINOPHIL NFR BLD: 0 % (ref 0–7)
ERYTHROCYTE [DISTWIDTH] IN BLOOD BY AUTOMATED COUNT: 17.1 % (ref 11.5–14.5)
EST. AVERAGE GLUCOSE BLD GHB EST-MCNC: 123 MG/DL
GLUCOSE SERPL-MCNC: 92 MG/DL (ref 65–100)
HBA1C MFR BLD: 5.9 % (ref 4–5.6)
HCT VFR BLD AUTO: 38 % (ref 35–47)
HDLC SERPL-MCNC: 35 MG/DL
HDLC SERPL: 3.7 {RATIO} (ref 0–5)
HGB BLD-MCNC: 12.7 G/DL (ref 11.5–16)
IMM GRANULOCYTES # BLD AUTO: 0 K/UL (ref 0–0.04)
IMM GRANULOCYTES NFR BLD AUTO: 0 % (ref 0–0.5)
LDLC SERPL CALC-MCNC: 71.6 MG/DL (ref 0–100)
LIPID PROFILE,FLP: NORMAL
LYMPHOCYTES # BLD: 1.1 K/UL (ref 0.8–3.5)
LYMPHOCYTES NFR BLD: 35 % (ref 12–49)
MCH RBC QN AUTO: 27.8 PG (ref 26–34)
MCHC RBC AUTO-ENTMCNC: 33.4 G/DL (ref 30–36.5)
MCV RBC AUTO: 83.2 FL (ref 80–99)
MONOCYTES # BLD: 0.5 K/UL (ref 0–1)
MONOCYTES NFR BLD: 15 % (ref 5–13)
NEUTS SEG # BLD: 1.5 K/UL (ref 1.8–8)
NEUTS SEG NFR BLD: 49 % (ref 32–75)
NRBC # BLD: 0.02 K/UL (ref 0–0.01)
NRBC BLD-RTO: 0.7 PER 100 WBC
PLATELET # BLD AUTO: 102 K/UL (ref 150–400)
PMV BLD AUTO: 12 FL (ref 8.9–12.9)
POTASSIUM SERPL-SCNC: 4.5 MMOL/L (ref 3.5–5.1)
RBC # BLD AUTO: 4.57 M/UL (ref 3.8–5.2)
SODIUM SERPL-SCNC: 136 MMOL/L (ref 136–145)
TRIGL SERPL-MCNC: 112 MG/DL (ref ?–150)
TROPONIN I SERPL-MCNC: 0.73 NG/ML
TSH SERPL DL<=0.05 MIU/L-ACNC: 4.35 UIU/ML (ref 0.36–3.74)
VLDLC SERPL CALC-MCNC: 22.4 MG/DL
WBC # BLD AUTO: 3 K/UL (ref 3.6–11)

## 2020-02-29 PROCEDURE — 84443 ASSAY THYROID STIM HORMONE: CPT

## 2020-02-29 PROCEDURE — 51798 US URINE CAPACITY MEASURE: CPT

## 2020-02-29 PROCEDURE — 80048 BASIC METABOLIC PNL TOTAL CA: CPT

## 2020-02-29 PROCEDURE — 94640 AIRWAY INHALATION TREATMENT: CPT

## 2020-02-29 PROCEDURE — 36415 COLL VENOUS BLD VENIPUNCTURE: CPT

## 2020-02-29 PROCEDURE — 74011250636 HC RX REV CODE- 250/636: Performed by: INTERNAL MEDICINE

## 2020-02-29 PROCEDURE — 74011250637 HC RX REV CODE- 250/637: Performed by: INTERNAL MEDICINE

## 2020-02-29 PROCEDURE — 85025 COMPLETE CBC W/AUTO DIFF WBC: CPT

## 2020-02-29 PROCEDURE — 74011000250 HC RX REV CODE- 250: Performed by: EMERGENCY MEDICINE

## 2020-02-29 PROCEDURE — 65660000000 HC RM CCU STEPDOWN

## 2020-02-29 PROCEDURE — 80061 LIPID PANEL: CPT

## 2020-02-29 PROCEDURE — 83036 HEMOGLOBIN GLYCOSYLATED A1C: CPT

## 2020-02-29 PROCEDURE — 82550 ASSAY OF CK (CPK): CPT

## 2020-02-29 PROCEDURE — 84484 ASSAY OF TROPONIN QUANT: CPT

## 2020-02-29 RX ORDER — METOPROLOL SUCCINATE 25 MG/1
25 TABLET, EXTENDED RELEASE ORAL DAILY
Status: DISCONTINUED | OUTPATIENT
Start: 2020-03-01 | End: 2020-03-03 | Stop reason: HOSPADM

## 2020-02-29 RX ADMIN — Medication 10 ML: at 05:38

## 2020-02-29 RX ADMIN — ARFORMOTEROL TARTRATE 15 MCG: 15 SOLUTION RESPIRATORY (INHALATION) at 09:03

## 2020-02-29 RX ADMIN — HYDROXYCHLOROQUINE SULFATE 200 MG: 200 TABLET ORAL at 08:36

## 2020-02-29 RX ADMIN — Medication 10 ML: at 21:39

## 2020-02-29 RX ADMIN — LEVOTHYROXINE SODIUM 112 MCG: 112 TABLET ORAL at 05:38

## 2020-02-29 RX ADMIN — IPRATROPIUM BROMIDE 0.5 MG: 0.5 SOLUTION RESPIRATORY (INHALATION) at 15:03

## 2020-02-29 RX ADMIN — Medication 10 ML: at 13:28

## 2020-02-29 RX ADMIN — PRAVASTATIN SODIUM 40 MG: 40 TABLET ORAL at 21:40

## 2020-02-29 RX ADMIN — ASPIRIN 81 MG 81 MG: 81 TABLET ORAL at 08:36

## 2020-02-29 RX ADMIN — IPRATROPIUM BROMIDE 2 SPRAY: 42 SPRAY NASAL at 08:37

## 2020-02-29 RX ADMIN — HYDROXYCHLOROQUINE SULFATE 200 MG: 200 TABLET ORAL at 17:25

## 2020-02-29 RX ADMIN — IPRATROPIUM BROMIDE 0.5 MG: 0.5 SOLUTION RESPIRATORY (INHALATION) at 09:03

## 2020-02-29 RX ADMIN — HEPARIN SODIUM 5000 UNITS: 5000 INJECTION INTRAVENOUS; SUBCUTANEOUS at 16:03

## 2020-02-29 RX ADMIN — HEPARIN SODIUM 5000 UNITS: 5000 INJECTION INTRAVENOUS; SUBCUTANEOUS at 00:55

## 2020-02-29 RX ADMIN — HEPARIN SODIUM 5000 UNITS: 5000 INJECTION INTRAVENOUS; SUBCUTANEOUS at 08:36

## 2020-02-29 NOTE — PROGRESS NOTES
Hospitalist Progress Note    NAME: Lina Oppenheim   :  1945   MRN:  [id number]       Assessment / Plan:  Hypotension POA- resolved today AM, SBP ~110's  -At baseline patient reports that her blood pressure normally is borderline and she does have a history of orthostatic hypotension in the past  -She is afebrile and has no leukocytosis  -Most likely reason for hypotension could be related to her cardiomyopathy, overdiuresis and medications (Lasix, Imdur, Ranexa and nitroglycerin)    S/p 250 bolus of normal saline in the ED. S/p gentle hydration with normal saline at 50 mL/h for 12 hours- now off it  S/p 1 dose of Midodrine 10 mg p.o. x1 in ER-consider starting on scheduled Midodrine if recommended by Cardiology  S/p 1 dose of albumin x1 in ER  Cardiology follow up today awaited for DC planning/ med recommendations     Acute kidney injury likely related to hypotension and medications (Lasix)  -Baseline creatinine is around 1.3 and currently creatinine is elevated at 2.53  -Hold all nephrotoxic medications including Lasix  -Check urinalysis with reflex culture  -Continue gentle hydration. Repeat BMP in a.m.  -She could be having cardiorenal syndrome due to advanced cardiomyopathy     Troponin elevation rule out non-STEMI  History of coronary artery disease status post cath  -EKG shows atrial paced rhythm  -First troponin is elevated 0.82- >0.66->0.73  -Patient reports that she had a coronary angiogram in 2019 and was told that her vessels are too small for stenting  TSH = 4.3    Continue her home aspirin and statin.   No beta-blocker due to hypotension  -Check hemoglobin A1c, Fasting lipid profile when available  -On-call cardiologist Dr. Oziel Barnard notified by ED and he recommended gentle hydration- s/p IVF overnight-- now off it         Severe systolic congestive heart failure  -Per patient, her last EF is around 15%  -Check a 2D echocardiogram. When available  -Hold Lasix & entresto due to acute kidney injury & hypotension      History of CVA  History of paroxysmal atrial fibrillation  History of COPD not in exacerbation  History of lupus  History of orthostatic hypotension  -Continue home aspirin  -Continue home inhalers  -Continue home chloroquine  -Consider starting on Midodrine if no improvement in blood pressure                 Code Status: Full code  Surrogate Decision Maker: Nadeen Knutson prior     DVT Prophylaxis: Heparin        Baseline: From home independent    Recommended Disposition: Cardiac Rehab as Outpatient once cleared by Cardiology     Subjective:     Chief Complaint / Reason for Physician Visit :F/U Hypotension, CHF systolic, KARMA  \"I am feeling better now\". Discussed with RN events overnight. Review of Systems:  Symptom Y/N Comments  Symptom Y/N Comments   Fever/Chills n   Chest Pain n    Poor Appetite n   Edema n    Cough n   Abdominal Pain n    Sputum n   Joint Pain n    SOB/FORRESTER n   Pruritis/Rash n    Nausea/vomit n   Tolerating PT/OT y    Diarrhea    Tolerating Diet y    Constipation    Other       Could NOT obtain due to:      Objective:     VITALS:   Last 24hrs VS reviewed since prior progress note.  Most recent are:  Patient Vitals for the past 24 hrs:   Temp Pulse Resp BP SpO2   02/29/20 0701 98.1 °F (36.7 °C) 68 16 121/68 98 %   02/29/20 0700    121/68    02/29/20 0526 97.8 °F (36.6 °C) 89 18 102/59 99 %   02/28/20 2238 98.7 °F (37.1 °C) 79 19 96/58 97 %   02/28/20 2001     99 %   02/28/20 1931 97.9 °F (36.6 °C) 71 20 (!) 89/57 99 %   02/28/20 1834  63  (!) 86/53 100 %   02/28/20 1804 97.5 °F (36.4 °C) 71 20 (!) 87/56 97 %   02/28/20 1730  75 25 96/64 99 %   02/28/20 1700  62 18 95/61 91 %   02/28/20 1645  64 18 (!) 89/63 97 %   02/28/20 1630  63 17 93/64 94 %   02/28/20 1615  63 14 94/68 97 %   02/28/20 1600  65 16 (!) 87/58 (!) 89 %   02/28/20 1545  67 18 103/66    02/28/20 1515  64  (!) 85/64 92 %   02/28/20 1453  64  (!) 80/61 100 %   02/28/20 1445  64  (!) 89/58 98 %   02/28/20 1430  61  (!) 81/62    02/28/20 1415  61  (!) 88/65    02/28/20 1227  61 18 90/58        Intake/Output Summary (Last 24 hours) at 2/29/2020 0846  Last data filed at 2/28/2020 1804  Gross per 24 hour   Intake 38.33 ml   Output    Net 38.33 ml        PHYSICAL EXAM:  General: WD, WN. Alert, cooperative, no acute distress    EENT:  EOMI. Anicteric sclerae. MMM  Resp:  CTA bilaterally, no wheezing or rales. No accessory muscle use  CV:  Regular  rhythm,  No edema  GI:  Soft, Non distended, Non tender.  +Bowel sounds  Neurologic:  Alert and oriented X 3, normal speech,   Psych:   Good insight. Not anxious nor agitated  Skin:  No rashes. No jaundice    Reviewed most current lab test results and cultures  YES  Reviewed most current radiology test results   YES  Review and summation of old records today    NO  Reviewed patient's current orders and MAR    YES  PMH/SH reviewed - no change compared to H&P  ________________________________________________________________________  Care Plan discussed with:    Comments   Patient x    Family      RN x    Care Manager     Consultant                        Multidiciplinary team rounds were held today with , nursing, pharmacist and clinical coordinator. Patient's plan of care was discussed; medications were reviewed and discharge planning was addressed. ________________________________________________________________________  Total NON critical care TIME:  36   Minutes    Total CRITICAL CARE TIME Spent:   Minutes non procedure based      Comments   >50% of visit spent in counseling and coordination of care     ________________________________________________________________________  Kavon Muñoz MD     Procedures: see electronic medical records for all procedures/Xrays and details which were not copied into this note but were reviewed prior to creation of Plan.       LABS:  I reviewed today's most current labs and imaging studies.   Pertinent labs include:  Recent Labs     02/29/20  0529 02/28/20  1259   WBC 3.0* 3.2*   HGB 12.7 14.0   HCT 38.0 42.9   * 109*     Recent Labs     02/29/20 0529 02/28/20  1259    133*   K 4.5 4.4    100   CO2 25 27   GLU 92 89   BUN 45* 40*   CREA 2.46* 2.53*   CA 8.5 8.7   ALB  --  3.4*   TBILI  --  0.6   SGOT  --  52*   ALT  --  38       Signed: Emilia Elizabeth MD

## 2020-02-29 NOTE — PROGRESS NOTES
0700 Bedside shift change report given to DIANNA Glynn (oncoming nurse) by Maira Reyes RN (offgoing nurse). Report included the following information SBAR, Kardex, Intake/Output, MAR, Recent Results and Cardiac Rhythm Paced. 0715 Pt resting quietly in bed at this time. VSS. BP improved from last night, 121/68 this AM. No complaints of pain at this time. Night shift RN to bladder scan pt to ensure she is not retaining.     0720 Bladder scan showed 121mL urine. Will continue to monitor at this time. 36 Dr. Gina Mcconnell at bedside. 1000  at bedside discussing advance medical directive with pt.     1050 Advance directive completed. Copy of advance directive placed in chart. 1325 Performed orthostatic blood pressures. Lying /65  Sitting /70   Standing /61  Pt denies dizziness with position change, only slightly light-headed. 1330 Pt assisted into recliner. Tolerated activity well. Will continue to monitor. 0 Dr. Jean-Pierre Sena at bedside. Metoprolol added to [facility]. OK to transfer pt to telemetry. Placed order per MD.     1611 TRANSFER - OUT REPORT:    Verbal report given to DIANNA Key (name) on [facility]  being transferred to Medical Telemetry (unit) for routine progression of care       Report consisted of patients Situation, Background, Assessment and   Recommendations(SBAR). Information from the following report(s) SBAR, Kardex, Intake/Output, MAR, Recent Results and Cardiac Rhythm NSR was reviewed with the receiving nurse. Lines:   Peripheral IV 02/28/20 Left Antecubital (Active)   Site Assessment Clean, dry, & intact 2/29/2020  3:00 PM   Phlebitis Assessment 0 2/29/2020  3:00 PM   Infiltration Assessment 0 2/29/2020  3:00 PM   Dressing Status Clean, dry, & intact 2/29/2020  3:00 PM   Dressing Type Tape;Transparent 2/29/2020  3:00 PM   Hub Color/Line Status Pink; Infusing 2/29/2020  3:00 PM   Alcohol Cap Used No 2/29/2020  3:00 PM        Opportunity for questions and clarification was provided.       Patient transported with:   Monitor  Registered Nurse

## 2020-02-29 NOTE — PROGRESS NOTES
Bedside shift change report given to [address] [place] [address] (oncoming nurse) by Kranthi Phelan RN (offgoing nurse). Report included the following information SBAR, Kardex, Procedure Summary, Intake/Output, MAR and Recent Results.

## 2020-02-29 NOTE — PROGRESS NOTES
Reason for Admission: Dizziness                      RUR Score: 13%                    Plan for utilizing home health: Unknown at this time, currently there are no PT/OT consults. The patient does report that she is independent in her ADLs and IADLs         PCP: First and Last name: Viviane Gaona   Name of Practice: [facility]    Are you a current patient: Yes/No: Yes   Approximate date of last visit: The patient last saw her PCP about 2 months ago                     Current Advanced Directive/Advance Care Plan: The patient is a full code and she has a MPOA/AD on-file                          Transition of Care Plan:    CM met with the patient at bedside to discuss disp plan. The patient resides in a 1 level home with 3 steps to enter by herself. She has 1 daughter and 1 son that live locally. She has 4 granddaughters and 7 great-grandchildren. She reports her family is very supportive. The patient is independent in her ADLs and IADLs. She uses a dolomite walker when ambulating. She has not been driving recently and her daughter assists with transportation needs. The patient uses [facility] for her medications. CM will continue to assist with dispo needs. Care Management Interventions  PCP Verified by CM: Yes(The patient last saw her PCP about 2 months ago )  Mode of Transport at Discharge:  Other (see comment)(The patient's daughter will assist with d/c transportation )  Transition of Care Consult (CM Consult): Discharge Planning  MyChart Signup: No  Discharge Durable Medical Equipment: No  Physical Therapy Consult: No  Occupational Therapy Consult: No  Speech Therapy Consult: No  Current Support Network: Lives Alone  Confirm Follow Up Transport: [facility] Information Provided?: No  Discharge Location  Discharge Placement: Home    [facility]

## 2020-02-29 NOTE — PROGRESS NOTES
ADULT PROTOCOL: JET AEROSOL ASSESSMENT    Patient  Emmanuel Jerome     66 y.o.   female     2/28/2020  10:17 PM    Breath Sounds Pre Procedure: Right Breath Sounds: Clear                               Left Breath Sounds: Clear    Breath Sounds Post Procedure: Right Breath Sounds: Clear                                 Left Breath Sounds: Clear    Breathing pattern: Pre procedure Breathing Pattern: Regular          Post procedure Breathing Pattern: Regular    Heart Rate: Pre procedure Pulse: 66           Post procedure Pulse: 62    Resp Rate: Pre procedure Respirations: 19           Post procedure Respirations: 20      Oxygen: O2 Device: Room air       Changed: NO    SpO2: Pre procedure SpO2: 99 %  WITHOUT oxygen              Post procedure SpO2: 97 %  WITHOUT oxygen    Nebulizer Therapy: Current medications Aerosolized Medications: Brovana, Ipratropium bromide      Changed: NO    Problem List:   Patient Active Problem List   Diagnosis Code    Respiratory arrest ([facility] R09.2    Syncope and collapse R55    Orthostatic hypotension I95.1    CVA (cerebral infarction) I63.9    CAD (coronary artery disease) I25.10    NSTEMI (non-ST elevated myocardial infarction) ([facility]) I21.4    Chest pain R07.9    Dehydration E86.0    Hypotension I95.9    KARMA (acute kidney injury) ([facility]) N17.9    Chronic systolic CHF (congestive heart failure) ([facility] I50.22       Respiratory Therapist: Jg Chavez

## 2020-02-29 NOTE — PROGRESS NOTES
Spiritual Care Assessment/Progress Note  [facility]      NAME: Catalina Butler      MRN: [id number]  AGE: 66 y.o.  SEX: female  Evangelical Affiliation: Yazidism   Language: English     2/29/2020     Total Time (in minutes): 65     Spiritual Assessment begun in [facility] PROGRESSIVE CARE through conversation with:         [x]Patient        [x] Family    [x] Friend(s)        Reason for Consult: Advance medical directive consult     Spiritual beliefs: (Please include comment if needed)     [x] Identifies with a rubén tradition:         [x] Supported by a rubén community:            [] Claims no spiritual orientation:           [] Seeking spiritual identity:                [] Adheres to an individual form of spirituality:           [] Not able to assess:                           Identified resources for coping:      [x] Prayer                               [] Music                  [] Guided Imagery     [x] Family/friends                 [] Pet visits     [] Devotional reading                         [] Unknown     [] Other:                                            Interventions offered during this visit: (See comments for more details)    Patient Interventions: Advance medical directive completed, Advance medical directive consult, Prayer (assurance of), Iconic (affirming the presence of God/Higher Power), Affirmation of rubén, Prayer (actual)     Family/Friend(s): Prayer (assurance of), Prayer (actual)     Plan of Care:     [] Support spiritual and/or cultural needs    [] Support AMD and/or advance care planning process      [] Support grieving process   [] Coordinate Rites and/or Rituals    [] Coordination with community clergy   [x] No spiritual needs identified at this time   [] Detailed Plan of Care below (See Comments)  [] Make referral to Music Therapy  [] Make referral to Pet Therapy     [] Make referral to Addiction services  [] Make referral to [facility]  [] Make referral to Spiritual Care Partner  [] No future visits requested        [x] Follow up visits as needed     Comments:  Responded to request by In Basket to assist with Advance Medical Directive (AMD) in Progressive Care unit. Consulted with patients nurse [facility], RN) who indicated that the patient is capable of completing an AMD document. Explained document to patient and her son and daughter-in-law, who were in the room. Assisted patient in completing the document. Made a copy for patients chart and handed it to [address] [place] [address]. Returned original document and three copies to the patient. The patient shared her spirituality without providing much detail. The patient mentioned that a deacon from her [employer] came by in the morning and they had prayer together. The patient and a friend stated that they would like to have prayer before concluding the visit. Advised of  availability. Chaplains will follow as able and/or needed. Rev.  Gayla Michael EdD MDiv     For  Assistance Page [phone])

## 2020-02-29 NOTE — PROGRESS NOTES
TRANSFER - IN REPORT:    Verbal report received from [facility]) on Clifton Voss  being received from PCU(unit) for routine progression of care      Report consisted of patients Situation, Background, Assessment and   Recommendations(SBAR). Information from the following report(s) SBAR, Kardex, Procedure Summary, Intake/Output, MAR and Recent Results was reviewed with the receiving nurse. Opportunity for questions and clarification was provided. Assessment completed upon patients arrival to unit and care assumed.

## 2020-02-29 NOTE — PROGRESS NOTES
1930 Bedside shift change report given to Fransico Anaya (oncoming nurse) by Meme Rome RN (offgoing nurse). Report included the following information Kardex, ED Summary, Intake/Output, MAR, Recent Results and Cardiac Rhythm Paced. 0886 Bedside shift change report given to Meme Rome [place] (oncoming nurse) by Fransico Anaya RN (offgoing nurse). Report included the following information Kardex, Intake/Output, MAR, Recent Results and Cardiac Rhythm Paced.     9012 Bladder scan shows patient retaining 121 mL. Pt voiding twice overnight approx 50 mL. Not able to measure.

## 2020-02-29 NOTE — PROGRESS NOTES
Problem: Breathing Pattern - Ineffective  Goal: *Absence of hypoxia  Outcome: Progressing Towards Goal  Goal: *Use of effective breathing techniques  Outcome: Progressing Towards Goal  Goal: *PALLIATIVE CARE:  Alleviation of Dyspnea  Outcome: Progressing Towards Goal     Problem: Falls - Risk of  Goal: *Absence of Falls  Description  Document Sosa Marilynvicente Fall Risk and appropriate interventions in the flowsheet.   Outcome: Progressing Towards Goal  Note: Fall Risk Interventions:  Mobility Interventions: Bed/chair exit alarm, Communicate number of staff needed for ambulation/transfer, Patient to call before getting OOB, Strengthening exercises (ROM-active/passive), Utilize walker, cane, or other assistive device         Medication Interventions: Assess postural VS orthostatic hypotension, Evaluate medications/consider consulting pharmacy, Patient to call before getting OOB, Teach patient to arise slowly         History of Falls Interventions: Door open when patient unattended, Evaluate medications/consider consulting pharmacy, Investigate reason for fall, Room close to nurse's station         Problem: Patient Education: Go to Patient Education Activity  Goal: Patient/Family Education  Outcome: Progressing Towards Goal     Problem: Chronic Obstructive Pulmonary Disease (COPD)  Goal: *Oxygen saturation during activity within specified parameters  Outcome: Progressing Towards Goal  Goal: *Able to remain out of bed as prescribed  Outcome: Progressing Towards Goal  Goal: *Absence of hypoxia  Outcome: Progressing Towards Goal  Goal: *Optimize nutritional status  Outcome: Progressing Towards Goal

## 2020-02-29 NOTE — ACP (ADVANCE CARE PLANNING)
Responded to request by In Basket to assist with Advance Medical Directive (AMD) in Progressive Care unit. Consulted with patients nurse [facility], RN) who indicated that the patient is capable of completing an AMD document. Explained document to patient and her son and daughter-in-law, who were in the room. Assisted patient in completing the document. Made a copy for patients chart and handed it to [address] [place] [address]. Returned original document and three copies to the patient. Rev.  Chapraro Meraz EdD MDiv     For  Assistance Page [phone])

## 2020-02-29 NOTE — PROGRESS NOTES
Cardiology Progress Note      2/29/2020 3:30 PM    Admit Date: 2/28/2020          Subjective: Feels well. Sitting up in bed. No chest pain or other c/o          Visit Vitals  /67 (BP 1 Location: Left arm, BP Patient Position: At rest)   Pulse 75   Temp 98.1 °F (36.7 °C)   Resp 18   Ht 5' 3\" (1.6 m)   Wt 55.8 kg (123 lb)   SpO2 96%   Breastfeeding No   BMI 21.79 kg/m²     02/27 1901 - 02/29 0700  In: 38.3 [I.V.:38.3]  Out: -         Objective:      Physical Exam:  VS as above  Neck s JVD  Chest CTA  Card RRR no gallop       Data Review:   Labs:    Trop 0.73  TSH 4.3   BUN 45  Creat 2.5  Hgb 12.7  Plat 102K     Telemetry: SR, BBB       Assessment:     Ischemic CM: Remote CABG; she reports cath 10/2019 @ [facility] showed \"small vessels, nothing could be done. \" Baseline EF 15-20% (9/2019, no change vs 2018). RVE with decreased RV Fxn; mild-mod MR; mod-severe TR. Primary prevention ICD: 10/2019 (Jony Sci).   CKD: ?baseline  HTN  COPD  SLE  Spinal stenosis. ? PAFib: eliquis started 2016 after CVAs note on imaging. RU lobectomy for lung CA  GERD  PVD: RLE stent @ [facility] 2013. Hypotension on admit      Plan: Resume Toprol XL. Increase activity. Home soon.

## 2020-03-01 LAB
ANION GAP SERPL CALC-SCNC: 6 MMOL/L (ref 5–15)
BACTERIA SPEC CULT: ABNORMAL
BUN SERPL-MCNC: 42 MG/DL (ref 6–20)
BUN/CREAT SERPL: 19 (ref 12–20)
CALCIUM SERPL-MCNC: 9 MG/DL (ref 8.5–10.1)
CC UR VC: ABNORMAL
CHLORIDE SERPL-SCNC: 104 MMOL/L (ref 97–108)
CO2 SERPL-SCNC: 26 MMOL/L (ref 21–32)
CREAT SERPL-MCNC: 2.22 MG/DL (ref 0.55–1.02)
GLUCOSE SERPL-MCNC: 98 MG/DL (ref 65–100)
POTASSIUM SERPL-SCNC: 4.8 MMOL/L (ref 3.5–5.1)
SERVICE CMNT-IMP: ABNORMAL
SODIUM SERPL-SCNC: 136 MMOL/L (ref 136–145)

## 2020-03-01 PROCEDURE — 74011250637 HC RX REV CODE- 250/637: Performed by: INTERNAL MEDICINE

## 2020-03-01 PROCEDURE — 65660000000 HC RM CCU STEPDOWN

## 2020-03-01 PROCEDURE — 74011250636 HC RX REV CODE- 250/636: Performed by: INTERNAL MEDICINE

## 2020-03-01 PROCEDURE — 36415 COLL VENOUS BLD VENIPUNCTURE: CPT

## 2020-03-01 PROCEDURE — 80048 BASIC METABOLIC PNL TOTAL CA: CPT

## 2020-03-01 PROCEDURE — 94760 N-INVAS EAR/PLS OXIMETRY 1: CPT

## 2020-03-01 RX ORDER — ISOSORBIDE MONONITRATE 30 MG/1
60 TABLET, EXTENDED RELEASE ORAL DAILY
Status: DISCONTINUED | OUTPATIENT
Start: 2020-03-02 | End: 2020-03-02

## 2020-03-01 RX ORDER — CEPHALEXIN 250 MG/1
500 CAPSULE ORAL EVERY 12 HOURS
Status: DISCONTINUED | OUTPATIENT
Start: 2020-03-01 | End: 2020-03-02

## 2020-03-01 RX ADMIN — HEPARIN SODIUM 5000 UNITS: 5000 INJECTION INTRAVENOUS; SUBCUTANEOUS at 00:18

## 2020-03-01 RX ADMIN — PRAVASTATIN SODIUM 40 MG: 40 TABLET ORAL at 22:00

## 2020-03-01 RX ADMIN — HEPARIN SODIUM 5000 UNITS: 5000 INJECTION INTRAVENOUS; SUBCUTANEOUS at 17:25

## 2020-03-01 RX ADMIN — HYDROXYCHLOROQUINE SULFATE 200 MG: 200 TABLET ORAL at 08:05

## 2020-03-01 RX ADMIN — Medication 10 ML: at 22:00

## 2020-03-01 RX ADMIN — CEPHALEXIN 500 MG: 250 CAPSULE ORAL at 20:10

## 2020-03-01 RX ADMIN — METOPROLOL SUCCINATE 25 MG: 25 TABLET, EXTENDED RELEASE ORAL at 08:05

## 2020-03-01 RX ADMIN — HEPARIN SODIUM 5000 UNITS: 5000 INJECTION INTRAVENOUS; SUBCUTANEOUS at 08:05

## 2020-03-01 RX ADMIN — ASPIRIN 81 MG 81 MG: 81 TABLET ORAL at 08:05

## 2020-03-01 RX ADMIN — LEVOTHYROXINE SODIUM 112 MCG: 112 TABLET ORAL at 05:24

## 2020-03-01 RX ADMIN — HYDROXYCHLOROQUINE SULFATE 200 MG: 200 TABLET ORAL at 17:26

## 2020-03-01 RX ADMIN — IPRATROPIUM BROMIDE 2 SPRAY: 42 SPRAY NASAL at 08:05

## 2020-03-01 RX ADMIN — Medication 10 ML: at 05:24

## 2020-03-01 RX ADMIN — CEPHALEXIN 500 MG: 250 CAPSULE ORAL at 08:38

## 2020-03-01 RX ADMIN — Medication 10 ML: at 14:00

## 2020-03-01 NOTE — PROGRESS NOTES
Cardiology Progress Note      3/1/2020 1:50 PM    Admit Date: 2/28/2020          Subjective: Feels ok. No chest pain, SOB, dizziness          Visit Vitals  /66 (BP 1 Location: Left arm, BP Patient Position: At rest)   Pulse 82   Temp 98.1 °F (36.7 °C)   Resp 18   Ht 5' 3\" (1.6 m)   Wt 61 kg (134 lb 7.7 oz)   SpO2 99%   Breastfeeding No   BMI 23.82 kg/m²     02/28 1901 - 03/01 0700  In: 1507.5 [P.O.:780; I.V.:727.5]  Out: 175 [Urine:175]        Objective:      Physical Exam:  VS as above  Chest CTA  Card RRR no gallop     Data Review:   Labs:    BUN 42  Creat 2.2  K 4.8     Telemetry: SR, 1st deg AVB, BBB       Assessment:     Ischemic CM: Remote CABG; she reports cath 10/2019 @ [facility] showed \"small vessels, nothing could be done. \" Baseline EF 15-20% (9/2019, no change vs 2018). RVE with decreased RV Fxn; mild-mod MR; mod-severe TR. Primary prevention ICD: 10/2019 (Jony Sci).   CKD: ?baseline  HTN  COPD  SLE  Spinal stenosis. ? PAFib: eliquis started 2016 after CVAs note on imaging. RU lobectomy for lung CA  GERD  PVD: RLE stent @ [facility] 2013. Hypotension on admit     Plan: Resume Imdur.  Hold off on restarting Entresto until creat better

## 2020-03-01 NOTE — PROGRESS NOTES
Bedside and Verbal shift change report given to Yesi RN (oncoming nurse) by Cathi Reveles RN (offgoing nurse).  Report included the following information

## 2020-03-01 NOTE — PROGRESS NOTES
Hospitalist Progress Note    NAME: Malini Pyle   :  1945   MRN:  [id number]       Assessment / Plan:  Hypotension POA- resolved now & stable, SBP ~120's,  Abnormal UA POA- grew 30k colonies of Gram neg rods- pt apparently asymptomatic  -At baseline patient reports that her blood pressure normally is borderline and she does have a history of orthostatic hypotension in the past  -She is afebrile and has no leukocytosis  -Most likely reason for hypotension could be related to her cardiomyopathy, overdiuresis and medications (Lasix, Imdur, Ranexa and nitroglycerin)    S/p 250 bolus of normal saline in the ED. S/p gentle hydration with normal saline at 50 mL/h for 12 hours- now off it  S/p 1 dose of Midodrine 10 mg p.o. x1 in ER-consider starting on scheduled Midodrine if recommended by Cardiology  S/p 1 dose of albumin x1 in ER  Cardiology follow up noted- added back metoprolol XL , awaiting followup today on recc of resuming Diuretics/?dose if recc to resume  Will empirically treat with Keflex x 3 days for ? UTI in light of hypotension on presentation     Acute kidney injury likely related to hypotension and medications (Lasix)  -Baseline creatinine is around 1.3 and currently creatinine is elevated at 2.53->2.4->2.2  -Hold all nephrotoxic medications including Lasix  -Check urinalysis with reflex culture  S/p gentle hydration for 1st 24 hrs, now off it  follow BMP in a.m.  -She could be having cardiorenal syndrome due to advanced cardiomyopathy     Troponin elevation rule out non-STEMI  History of coronary artery disease status post cath  -EKG shows atrial paced rhythm  -First troponin is elevated 0.82- >0.66->0.73  -Patient reports that she had a coronary angiogram in 2019 and was told that her vessels are too small for stenting  TSH = 4.3    Continue her home aspirin and statin.   No beta-blocker due to hypotension  -Check hemoglobin A1c, Fasting lipid profile when available  -On-call cardiologist Dr. Katy Brothers notified by ED and he recommended gentle hydration- s/p IVF overnight-- now off it        Severe systolic congestive heart failure  -Per patient, her last EF is around 15%  -Check a 2D echocardiogram. When available  -Hold Lasix & entresto due to acute kidney injury -- resumption dose deferred to Cardiology     History of CVA  History of paroxysmal atrial fibrillation  History of COPD not in exacerbation  History of lupus  History of orthostatic hypotension  -Continue home aspirin  -Continue home inhalers  -Continue home chloroquine  -Consider starting on Midodrine if no improvement in blood pressure                 Code Status: Full code  Surrogate Decision Maker: Nadeen Oviedo prior     DVT Prophylaxis: Heparin        Baseline: From home independent    Recommended Disposition: Cardiac Rehab as Outpatient once cleared by Cardiology- ?24 hrs     Subjective:     Chief Complaint / Reason for Physician Visit :F/U Hypotension, CHF systolic, KARMA  \"I am feeling better now\". Discussed with RN events overnight. Review of Systems:  Symptom Y/N Comments  Symptom Y/N Comments   Fever/Chills n   Chest Pain n    Poor Appetite n   Edema n    Cough n   Abdominal Pain n    Sputum n   Joint Pain n    SOB/FORRESTER n   Pruritis/Rash n    Nausea/vomit n   Tolerating PT/OT y    Diarrhea    Tolerating Diet y    Constipation    Other       Could NOT obtain due to:      Objective:     VITALS:   Last 24hrs VS reviewed since prior progress note.  Most recent are:  Patient Vitals for the past 24 hrs:   Temp Pulse Resp BP SpO2   03/01/20 0229 97.4 °F (36.3 °C) 70 18 124/73 99 %   03/01/20 0015 98 °F (36.7 °C) 71 16 112/64 96 %   02/29/20 2045 98.3 °F (36.8 °C) 71 16 121/67 99 %   02/29/20 1726  72  109/65    02/29/20 1627 97.6 °F (36.4 °C) 74 16 124/71 100 %   02/29/20 1600  73      02/29/20 1503     96 %   02/29/20 1500 98.1 °F (36.7 °C) 75 18 112/67 97 %   02/29/20 1400    104/64    02/29/20 1327  75  112/61 97 %   02/29/20 1325  73  114/70 100 %   02/29/20 1323  73  104/65 100 %   02/29/20 1300    107/63    02/29/20 1200    107/62    02/29/20 1130  75  109/74 98 %   02/29/20 1100  71  93/58    02/29/20 1059 98 °F (36.7 °C) 72 16 93/58 97 %   02/29/20 1030    94/54    02/29/20 1000    90/60    02/29/20 0930    99/63    02/29/20 0904     98 %   02/29/20 0900  72  103/56    02/29/20 0800  72  113/64 100 %       Intake/Output Summary (Last 24 hours) at 3/1/2020 0753  Last data filed at 2/29/2020 1737  Gross per 24 hour   Intake 620 ml   Output 75 ml   Net 545 ml        PHYSICAL EXAM:  General: WD, WN. Alert, cooperative, no acute distress    EENT:  EOMI. Anicteric sclerae. MMM  Resp:  CTA bilaterally, no wheezing or rales. No accessory muscle use  CV:  Regular  rhythm,  No edema  GI:  Soft, Non distended, Non tender.  +Bowel sounds  Neurologic:  Alert and oriented X 3, normal speech,   Psych:   Good insight. Not anxious nor agitated  Skin:  No rashes. No jaundice    Reviewed most current lab test results and cultures  YES  Reviewed most current radiology test results   YES  Review and summation of old records today    NO  Reviewed patient's current orders and MAR    YES  PMH/ reviewed - no change compared to H&P  ________________________________________________________________________  Care Plan discussed with:    Comments   Patient x    Family      RN x    Care Manager     Consultant                        Multidiciplinary team rounds were held today with , nursing, pharmacist and clinical coordinator. Patient's plan of care was discussed; medications were reviewed and discharge planning was addressed.      ________________________________________________________________________  Total NON critical care TIME:  26   Minutes    Total CRITICAL CARE TIME Spent:   Minutes non procedure based      Comments   >50% of visit spent in counseling and coordination of care ________________________________________________________________________  Gisselle Hardin MD     Procedures: see electronic medical records for all procedures/Xrays and details which were not copied into this note but were reviewed prior to creation of Plan. LABS:  I reviewed today's most current labs and imaging studies.   Pertinent labs include:  Recent Labs     02/29/20  0529 02/28/20  1259   WBC 3.0* 3.2*   HGB 12.7 14.0   HCT 38.0 42.9   * 109*     Recent Labs     03/01/20  0236 02/29/20  0529 02/28/20  1259    136 133*   K 4.8 4.5 4.4    105 100   CO2 26 25 27   GLU 98 92 89   BUN 42* 45* 40*   CREA 2.22* 2.46* 2.53*   CA 9.0 8.5 8.7   ALB  --   --  3.4*   TBILI  --   --  0.6   SGOT  --   --  52*   ALT  --   --  38       Signed: Gisselle Hardin MD

## 2020-03-01 NOTE — PROGRESS NOTES
Pharmacy Automatic Renal Dosing Protocol - Antimicrobials    Indication for Antimicrobials: UTI     Current Regimen of Each Antimicrobial:  Cephalexain 500 mg PO q12h (Start Date 3/1; Day # 1)    Previous Antimicrobial Therapy:    Significant Cultures:     Radiology / Imaging results: (X-ray, CT scan or MRI):     Paralysis, amputations, malnutrition:     Labs:  Recent Labs     20  0236 20  0529 20  1259   CREA 2.22* 2.46* 2.53*   BUN 42* 45* 40*   WBC  --  3.0* 3.2*     Temp (24hrs), Av.9 °F (36.6 °C), Min:97.4 °F (36.3 °C), Max:98.3 °F (36.8 °C)    Creatinine Clearance (mL/min) or Dialysis: 17.8     Impression/Plan:   Empiric Cephalexin renally adjusted to 500 mg PO q12h per protocol  BMP  Antimicrobial stop date: 3 days     Pharmacy will follow daily and adjust medications as appropriate for renal function and/or serum levels.     Thank you,  Isa Huertas, PHARMD

## 2020-03-01 NOTE — PROGRESS NOTES
Bedside shift change report given to [address] (oncoming nurse) by Corazon Wagner RN (offgoing nurse). Report included the following information SBAR, Kardex, Intake/Output, MAR and Recent Results.

## 2020-03-02 LAB
ANION GAP SERPL CALC-SCNC: 7 MMOL/L (ref 5–15)
BUN SERPL-MCNC: 39 MG/DL (ref 6–20)
BUN/CREAT SERPL: 20 (ref 12–20)
CALCIUM SERPL-MCNC: 9.1 MG/DL (ref 8.5–10.1)
CHLORIDE SERPL-SCNC: 104 MMOL/L (ref 97–108)
CO2 SERPL-SCNC: 25 MMOL/L (ref 21–32)
CREAT SERPL-MCNC: 1.97 MG/DL (ref 0.55–1.02)
ERYTHROCYTE [DISTWIDTH] IN BLOOD BY AUTOMATED COUNT: 17.6 % (ref 11.5–14.5)
GLUCOSE SERPL-MCNC: 99 MG/DL (ref 65–100)
HCT VFR BLD AUTO: 41.5 % (ref 35–47)
HGB BLD-MCNC: 13.5 G/DL (ref 11.5–16)
MCH RBC QN AUTO: 27.7 PG (ref 26–34)
MCHC RBC AUTO-ENTMCNC: 32.5 G/DL (ref 30–36.5)
MCV RBC AUTO: 85 FL (ref 80–99)
NRBC # BLD: 0 K/UL (ref 0–0.01)
NRBC BLD-RTO: 0 PER 100 WBC
PLATELET # BLD AUTO: 116 K/UL (ref 150–400)
PMV BLD AUTO: 11.5 FL (ref 8.9–12.9)
POTASSIUM SERPL-SCNC: 4.2 MMOL/L (ref 3.5–5.1)
RBC # BLD AUTO: 4.88 M/UL (ref 3.8–5.2)
SODIUM SERPL-SCNC: 136 MMOL/L (ref 136–145)
WBC # BLD AUTO: 3.7 K/UL (ref 3.6–11)

## 2020-03-02 PROCEDURE — 74011250637 HC RX REV CODE- 250/637: Performed by: INTERNAL MEDICINE

## 2020-03-02 PROCEDURE — 85027 COMPLETE CBC AUTOMATED: CPT

## 2020-03-02 PROCEDURE — 94760 N-INVAS EAR/PLS OXIMETRY 1: CPT

## 2020-03-02 PROCEDURE — 36415 COLL VENOUS BLD VENIPUNCTURE: CPT

## 2020-03-02 PROCEDURE — 65660000000 HC RM CCU STEPDOWN

## 2020-03-02 PROCEDURE — 80048 BASIC METABOLIC PNL TOTAL CA: CPT

## 2020-03-02 PROCEDURE — 74011250636 HC RX REV CODE- 250/636: Performed by: INTERNAL MEDICINE

## 2020-03-02 RX ORDER — ISOSORBIDE MONONITRATE 30 MG/1
30 TABLET, EXTENDED RELEASE ORAL DAILY
Status: DISCONTINUED | OUTPATIENT
Start: 2020-03-02 | End: 2020-03-03 | Stop reason: HOSPADM

## 2020-03-02 RX ORDER — LOSARTAN POTASSIUM 25 MG/1
12.5 TABLET ORAL DAILY
Status: DISCONTINUED | OUTPATIENT
Start: 2020-03-02 | End: 2020-03-03 | Stop reason: HOSPADM

## 2020-03-02 RX ORDER — LEVOFLOXACIN 250 MG/1
250 TABLET ORAL EVERY 24 HOURS
Status: DISCONTINUED | OUTPATIENT
Start: 2020-03-02 | End: 2020-03-03 | Stop reason: HOSPADM

## 2020-03-02 RX ADMIN — CEPHALEXIN 500 MG: 250 CAPSULE ORAL at 09:59

## 2020-03-02 RX ADMIN — HEPARIN SODIUM 5000 UNITS: 5000 INJECTION INTRAVENOUS; SUBCUTANEOUS at 15:09

## 2020-03-02 RX ADMIN — IPRATROPIUM BROMIDE 2 SPRAY: 42 SPRAY NASAL at 10:41

## 2020-03-02 RX ADMIN — ASPIRIN 81 MG 81 MG: 81 TABLET ORAL at 09:59

## 2020-03-02 RX ADMIN — Medication 10 ML: at 21:23

## 2020-03-02 RX ADMIN — METOPROLOL SUCCINATE 25 MG: 25 TABLET, EXTENDED RELEASE ORAL at 09:59

## 2020-03-02 RX ADMIN — HYDROXYCHLOROQUINE SULFATE 200 MG: 200 TABLET ORAL at 17:50

## 2020-03-02 RX ADMIN — Medication 10 ML: at 15:03

## 2020-03-02 RX ADMIN — ALBUTEROL SULFATE 2 PUFF: 90 AEROSOL, METERED RESPIRATORY (INHALATION) at 12:24

## 2020-03-02 RX ADMIN — ISOSORBIDE MONONITRATE 30 MG: 30 TABLET, EXTENDED RELEASE ORAL at 10:00

## 2020-03-02 RX ADMIN — Medication 10 ML: at 06:36

## 2020-03-02 RX ADMIN — LEVOFLOXACIN 250 MG: 250 TABLET, FILM COATED ORAL at 15:03

## 2020-03-02 RX ADMIN — HYDROXYCHLOROQUINE SULFATE 200 MG: 200 TABLET ORAL at 09:59

## 2020-03-02 RX ADMIN — RANOLAZINE 500 MG: 500 TABLET, FILM COATED, EXTENDED RELEASE ORAL at 17:50

## 2020-03-02 RX ADMIN — LEVOTHYROXINE SODIUM 112 MCG: 112 TABLET ORAL at 06:36

## 2020-03-02 RX ADMIN — LOSARTAN POTASSIUM 12.5 MG: 25 TABLET ORAL at 09:59

## 2020-03-02 RX ADMIN — PRAVASTATIN SODIUM 40 MG: 40 TABLET ORAL at 21:22

## 2020-03-02 RX ADMIN — HEPARIN SODIUM 5000 UNITS: 5000 INJECTION INTRAVENOUS; SUBCUTANEOUS at 01:37

## 2020-03-02 RX ADMIN — HEPARIN SODIUM 5000 UNITS: 5000 INJECTION INTRAVENOUS; SUBCUTANEOUS at 09:59

## 2020-03-02 NOTE — PROGRESS NOTES
Bedside shift change report given to Jessie Jerry (oncoming nurse) by [facility] (offgoing nurse). Report included the following information SBAR, Kardex, Intake/Output, MAR, Accordion and Recent Results.      Anticipate D/C in AM.

## 2020-03-02 NOTE — PROGRESS NOTES
Cardiology Progress Note  3/2/2020     Admit Date: 2/28/2020  Admit Diagnosis: Hypotension [I95.9]  KARMA (acute kidney injury) ([facility] [address] [N17.9]  Dehydration [A50.2]  Chronic systolic CHF (congestive heart failure) ([facility] [address] [I50.22]  CC: none currently  Cardiologist:  Dr Felicia Foy in 2016; Dr Zev Walton since then. Cardiac Assessment/Plan:    1) Ischemic CM: Remote CABG; she reports cath 10/2019 @ [facility] showed \"small vessels, nothing could be done. \" Baseline EF 15-20% (9/2019, no change vs 2018). RVE with decreased RV Fxn; mild-mod MR; mod-severe TR.     2) Primary prevention ICD: 10/2019 (Jony Sci).    3) CKD: ?baseline  4) HTN  5) COPD  6) SLE  7) Spinal stenosis. 8) ? PAFib: eliquis started 2016 after CVAs note on imaging. 9) RU lobectomy for lung CA  10) GERD  11) PVD: RLE stent @ [facility] 2013.     Presenting from rehab with hypotension; no CP/acute dyspnea; KARMA by Cr, indeterminate troponin. Paced ECG.     Rec; not having an ACS; agree with gentle hydration; watch Cr/cycle enzymes. Hold home entresto, imdur, lasix for now; She should be on toprol Xl, unless previously intolerant. Monitor orthostatics.      3/2: BPs 100-120; HR 60-90s; Paced on tele. Hg nl; Cr 2.  Cardiac Meds: asa 81; imdur 60; toprol XL 25; pravachol 40; ranexa 500 bid. Rec: decrease imdur to 30; add losartan 12.5 qday; transition to entresto as outpt (via Dr Magda Mccoy). If tolerating meds, ok for d/c from cardiac standpoint; will need some lasix on d/c: would Rx with lasix 20 qam.  F/U with Dr Magda Mccoy.  ____________________________________________________________________  Latosha Guidry is a 66 y.o. female presents with Hypotension [I95.9]  KARMA (acute kidney injury) ([facility] [address] [N17.9]  Dehydration [B68.1]  Chronic systolic CHF (congestive heart failure) ([facility] [address] [I50.22].    As noted POA: \"The patient reports no chest pain/pressure; chronic FORRESTER and LE edema that is unchanged. No PND, orthopnea, palpitations, pre-syncope, syncope. No current complaints. She reports compliance with meds/diet; no recent acute illness.     She felt marked fatigue, some lightheaded earlier today at rehab; BP was low (80s) so sent to Er; SBP currently 94.     ECG: Vpacing, prob sinus  Tele: Vpacing  CXR: \"No acute findings. \"     Notable labs: Hg 14; Na 133; Cr 2.53; BUN 40; proBNP 14k; trop 0.8 (no CK). \"   ____________________________________________________________________________________  Notable prior cardiac history:  @ [facility] 8/2016:       Patient with hypertension with some orthostasis after cva, dyslipidemia, SLE, GERD, PVD with right lower ext stents Dr. Donna Soni, lung CA s/p RUL lobectomy in 1999/copd/quit tobacco follows with Dr. Geneva Hicks, cholecystectomy, kidney stones, spinal stenosis, EGD/colonoscopy 2/2016 Dr. Odette Granados. 5V CABG in 2004. Presented with chest pain and syncope 6/2015. Cath showed patent svg to LAD, LAD with distal small vessel disease, atretic LIMA, LCX is codominant and has two subtotalled OM's distal LCx has 90% stenosis prior to another OM, RCA occluded. Post cath found to have multiple CVA, concern for embolic, now on eliquis, echo 6/2015 EF 40%, carotid with < 50% bilaterally, sinus with RBBB/LAHB. Small nstemi 1/2016 treated medically. Hypertensive urgency 8/2016. Limited functional capacity, currently exercising at home every day, walking at mall. Feeling better with titration of medications. No chest pain. Stable dyspnea. No chest discomfort suggestive of ischemia. Denies orthopnea, PND, or edema. No palpitations, syncope, near syncope. No other complaints.     IMPRESSION AND PLAN  01. Atherosclerotic heart disease of native coronary artery with other forms of angina pectoris:  Severe branch vessel disease. Class II angina. Small NSTEMI 1/2016 managed medically. Angina in setting of hypertensive urgency in 8/2016. Stable class II symptoms again. ECG done today  We will continue the current medical therapy. 02. Ischemic cardiomyopathy:  On beta blocker and ARB. 03. Bifascicular block:  Found to have CVA, being treated for possible proxysmal afib empirically, never documented. 04. Essential (primary) hypertension:  Adequately controlled. Better with addition of amlodapine and increase of metoprolol. 05. Mixed hyperlipidemia:  Crestor to expensive, did not tolerate atorvastatin, doing ok with pravastatin. 06. Personal history of nicotine dependence:  remains abstinent from tobacco use. 07. Long term (current) use of anticoagulants: This condition is stable. 08. Long term (current) use of aspirin:  This condition is stable. 09. Body mass index (BMI) 25.0-25.9, adult   10.  Body mass index (BMI) 23.0-23.9, adult            ORDERS:  1 ECG done today   2 Return office visit with Adithya Carballo MD in 6 Months.      8/2016 MEDICATION LIST     Medication Sig Description   albuterol sulfate HFA 90 mcg/actuation aerosol inhaler inhale 2 puff by inhalation route  every 4 - 6 hours as needed   aspirin 81 mg tablet,delayed release take 1 tablet by oral route  every day   Eliquis 5 mg tablet take 1 tablet by oral route 2 times every day   hydrochlorothiazide 12.5 mg tablet take 1 tablet by oral route  every day   isosorbide mononitrate ER 60 mg tablet,extended release 24 hr take 1 tablet by oral route  every day in the morning   losartan 100 mg tablet take 1 tablet by oral route  every day   metoprolol tartrate 50 mg tablet take 1 tablet by oral route 2 times every day with meals   nitroglycerin 0.4 mg sublingual tablet place 1 tablet by sublingual route at the 1st sign of attack; may repeat every 5 min until relief; if pain persists after 3 tablets in 15 min, prompt medical attention is recommended   Norvasc 5 mg tablet take 1 tablet by oral route  every morning   pantoprazole 40 mg tablet,delayed release take 1 tablet by oral route  every day   Plaquenil 200 mg tablet take 1 tablet by oral route 2 times every day pravastatin 40 mg tablet take 1 tablet by oral route  every day   Spiriva with HandiHaler 18 mcg & inhalation capsules inhale 1 capsule by inhalation route  every day   Synthroid 88 mcg tablet take 1 tablet by oral route  every day   Vitamin D3 1,000 unit tablet 1 po qd   ______________________________________________________________________________  CARDIAC HISTORY  RISK FACTORS:  1 Peripheral Vascular Disease   2 Hypertension   3 Dyslipidemia         CARDIOVASCULAR PROCEDURES  CATH LAB:  Cath (Patent svg to LAD, ?lima to diag, atretic.   native lCx with distal stenosis, RCA occluded in mid portion) - 6/14/2015   CV SURGERY:  CV Surgery (CABG) - 2004   Vasc Surgery (Stents in legs [facility]) - 3/2012   ECHO/MUGA:  Echo (EF 0.40 (40%)) - 6/15/2015   ELECTROPHYSIOLOGY:  EKG (Sinus Rhythm, Hr 58, PAC,  RBBB, LAHB) - 8/18/2016   STRESS TESTS:  [place] MPI (EF 0.74, Small area of inferior ischemia. Unchanged from stress tests dating back to 10/2008 and consistant with patients known occluded SVG to RCA. ) - 8/15/2013   VASCULAR:  Carotid Duplex (Less than 50% Bilateral ICAs) - 6/17/2015     For other plans, see orders.   Hospital problem list   Active Hospital Problems    Diagnosis Date Noted    Dehydration 02/28/2020    Hypotension 02/28/2020    KARMA (acute kidney injury) ([facility] [address] 02/28/2020    Chronic systolic CHF (congestive heart failure) ([facility] [address] 02/28/2020        Subjective:  Generous reports   Chest pain X none  consistent with:  Non-cardiac CP         Atypical CP     None now  On going  Anginal CP     Dyspnea X none  at rest  with exertion         improved  unchanged  worse              PND X none  overnight       Orthopnea X none  improved  unchanged  worse   Presyncope X none  improved  unchanged  worse     Ambulated in hallway without symptoms  x Yes   Ambulated in room without symptoms x Yes   Objective:    Physical Exam:  Overall VSSAF;    Visit Vitals  /76 (BP 1 Location: Left arm, BP Patient Position: At rest)   Pulse 67   Temp 97.8 °F (36.6 °C)   Resp 18   Ht 5' 3\" (1.6 m)   Wt 61.3 kg (135 lb 3.2 oz)   SpO2 95%   Breastfeeding No   BMI 23.95 kg/m²     Temp (24hrs), Av °F (36.7 °C), Min:97.7 °F (36.5 °C), Max:98.1 °F (36.7 °C)    Patient Vitals for the past 8 hrs:   Pulse   20 67     Patient Vitals for the past 8 hrs:   Resp   20 0344 18     Patient Vitals for the past 8 hrs:   BP   20 0344 101/76       Intake/Output Summary (Last 24 hours) at 3/2/2020 0752  Last data filed at 3/1/2020 1749  Gross per 24 hour   Intake 440 ml   Output    Net 440 ml     General Appearance: Well developed, elderly, no acute distress. Ears/Nose/Mouth/Throat:   Normal MM; anicteric. JVP: WNL   Resp:   clear to auscultation bilaterally. Nl resp effort. Cardiovascular:  RRR, S1, S2 normal, no new murmur. No gallop or rub. Abdomen:   Soft, non-tender, bowel sounds are present. Extremities: No edema bilaterally. Skin:  Neuro: Warm and dry. A/O x3, grossly nonfocal   cath site intact w/o hematoma or new bruit; distal pulse unchanged  Yes   Data Review:     Telemetry independently reviewed x paced  chronic afib  parox afib  NSVT     ECG independently reviewed  NSR  afib  no significant changes  NSST-Tw chgs    no new ECG provided for review   Lab results reviewed as noted below. Current medications reviewed as noted below. No results for input(s): PH, PCO2, PO2 in the last 72 hours.   Recent Labs     20  0529 20  1259   TROIQ 0.73* 0.66* 0.82*     Recent Labs     20  0139 20  0236 20  0529 20  1259    136 136 133*   K 4.2 4.8 4.5 4.4    104 105 100   CO2 25 26 25 27   BUN 39* 42* 45* 40*   CREA 1.97* 2.22* 2.46* 2.53*   GFRAA 30* 26* 23* 22*   GLU 99 98 92 89   CA 9.1 9.0 8.5 8.7   ALB  --   --   --  3.4*   WBC 3.7  --  3.0* 3.2*   HGB 13.5  --  12.7 14.0   HCT 41.5  --  38.0 42.9   *  --  102* 109* Lab Results   Component Value Date/Time    Cholesterol, total 129 02/29/2020 05:29 AM    HDL Cholesterol 35 02/29/2020 05:29 AM    LDL, calculated 71.6 02/29/2020 05:29 AM    Triglyceride 112 02/29/2020 05:29 AM    CHOL/HDL Ratio 3.7 02/29/2020 05:29 AM     Recent Labs     02/28/20  1259   SGOT 52*   AP 97   TP 6.8   ALB 3.4*   GLOB 3.4     No results for input(s): INR, PTP, APTT, INREXT in the last 72 hours.    No components found for: GLPOC    Current Facility-Administered Medications   Medication Dose Route Frequency    isosorbide mononitrate ER (IMDUR) tablet 30 mg  30 mg Oral DAILY    cephALEXin (KEFLEX) capsule 500 mg  500 mg Oral Q12H    metoprolol succinate (TOPROL-XL) XL tablet 25 mg  25 mg Oral DAILY    acetaminophen (TYLENOL) tablet 650 mg  650 mg Oral Q6H PRN    albuterol (PROVENTIL HFA, VENTOLIN HFA, PROAIR HFA) inhaler 2 Puff  2 Puff Inhalation Q4H PRN    aspirin chewable tablet 81 mg  81 mg Oral DAILY    hydroxychloroquine (PLAQUENIL) tablet 200 mg  200 mg Oral BID    ipratropium (ATROVENT) 42 mcg (0.06 %) nasal spray 2 Spray  2 Spray Both Nostrils DAILY    levothyroxine (SYNTHROID) tablet 112 mcg  112 mcg Oral ACB    pravastatin (PRAVACHOL) tablet 40 mg  40 mg Oral QHS    ranolazine ER (RANEXA) tablet 500 mg  500 mg Oral BID    sodium chloride (NS) flush 5-40 mL  5-40 mL IntraVENous Q8H    sodium chloride (NS) flush 5-40 mL  5-40 mL IntraVENous PRN    docusate sodium (COLACE) capsule 100 mg  100 mg Oral DAILY PRN    heparin (porcine) injection 5,000 Units  5,000 Units SubCUTAneous Mekhi Otero MD

## 2020-03-02 NOTE — PROGRESS NOTES
CORTES:  -patient does report that she is independent in her ADLs and IADLs       -saw PCP ~2 months ago  -dtr will transport at d/c  -the patient is a full code and she has a MPOA/AD on-file   -patient resides in a 1 level home with 3 steps to enter by herself.   -She has 1 daughter and 1 son that live locally. She has 4 granddaughters and 7 great-grandchildren. She reports her family is very supportive.   -uses a dolomite walker when ambulating.

## 2020-03-02 NOTE — PROGRESS NOTES
Hospitalist Progress Note    NAME: Kody Walker   :  1945   MRN:  [id number]       Assessment / Plan:  Hypotension POA- resolved now & stable, SBP ~120's,  Enterobacter Cloacae UTI POA- grew 30k colonies of Gram neg rods- pt apparently asymptomatic  -At baseline patient reports that her blood pressure normally is borderline and she does have a history of orthostatic hypotension in the past  -She is afebrile and has no leukocytosis  -Most likely reason for hypotension could be related to her cardiomyopathy, overdiuresis and medications (Lasix, Imdur, Ranexa and nitroglycerin)    S/p 250 bolus of normal saline in the ED. S/p gentle hydration with normal saline at 50 mL/h for 12 hours- now off it  S/p 1 dose of Midodrine 10 mg p.o. x1 in ER  S/p 1 dose of albumin x1 in ER  Cardiology follow up noted- added back metoprolol XL ,  Started on lower dose of Imdur (3omg), added losartan 12.5 mg  Will need low dose lasix on discharge per cardiology once Cr improved further- BMP in AM  Change from Keflex to levaquin x 3 days ? UTI in light of hypotension on presentation     Acute kidney injury likely related to hypotension and medications (Lasix)  -Baseline creatinine is around 1.3 and currently creatinine is elevated at 2.53->2.4->2.2->1.9 today  -Hold all nephrotoxic medications including Lasix  -Check urinalysis with reflex culture  S/p gentle hydration for 1st 24 hrs, now off it  follow BMP in a.m.  -She could be having cardiorenal syndrome due to advanced cardiomyopathy     Troponin elevation rule out non-STEMI  History of coronary artery disease status post cath  -EKG shows atrial paced rhythm  -First troponin is elevated 0.82- >0.66->0.73  -Patient reports that she had a coronary angiogram in 2019 and was told that her vessels are too small for stenting  TSH = 4.3    Continue her home aspirin and statin.   No beta-blocker due to hypotension  -Check hemoglobin A1c, Fasting lipid profile when available  -On-call cardiologist Dr. Maranda Tena notified by ED and he recommended gentle hydration- s/p IVF overnight-- now off it        Severe systolic congestive heart failure  -Per patient, her last EF is around 15%  -Check a 2D echocardiogram. When available  -Hold Lasix & entresto due to acute kidney injury --  Plan to resume Lasix at 20 mg daily on discharge but hold off Entresto till pt sees Dr Matthew Pena in office     History of CVA  History of paroxysmal atrial fibrillation  History of COPD not in exacerbation  History of lupus  History of orthostatic hypotension  -Continue home aspirin  -Continue home inhalers  -Continue home chloroquine  -Consider starting on Midodrine if no improvement in blood pressure                 Code Status: Full code  Surrogate Decision Maker: Daughter Jurgen Molina prior     DVT Prophylaxis: Heparin        Baseline: From home independent    Recommended Disposition: Cardiac Rehab as Outpatient - cleared for DC in 24 hrs if able to tolerate BP meds today     Subjective:     Chief Complaint / Reason for Physician Visit :F/U Hypotension, CHF systolic, KARMA  \"I am feeling better now\". Discussed with RN events overnight. Review of Systems:  Symptom Y/N Comments  Symptom Y/N Comments   Fever/Chills n   Chest Pain n    Poor Appetite n   Edema n    Cough n   Abdominal Pain n    Sputum n   Joint Pain n    SOB/FORRESTER n   Pruritis/Rash n    Nausea/vomit n   Tolerating PT/OT y    Diarrhea    Tolerating Diet y    Constipation    Other       Could NOT obtain due to:      Objective:     VITALS:   Last 24hrs VS reviewed since prior progress note.  Most recent are:  Patient Vitals for the past 24 hrs:   Temp Pulse Resp BP SpO2   03/02/20 1138 98.5 °F (36.9 °C) 71 16 132/70 96 %   03/02/20 0847 98.7 °F (37.1 °C) 71 20 114/70 97 %   03/02/20 0344 97.8 °F (36.6 °C) 67 18 101/76 95 %   03/01/20 2330 97.7 °F (36.5 °C) 94 16 116/81 97 %   03/01/20 2000 98.1 °F (36.7 °C) 70 16 114/70 99 %   03/01/20 1702 98.1 °F (36.7 °C) (!) 102 18 106/73 98 %       Intake/Output Summary (Last 24 hours) at 3/2/2020 1214  Last data filed at 3/1/2020 1749  Gross per 24 hour   Intake 200 ml   Output    Net 200 ml        PHYSICAL EXAM:  General: WD, WN. Alert, cooperative, no acute distress    EENT:  EOMI. Anicteric sclerae. MMM  Resp:  CTA bilaterally, no wheezing or rales. No accessory muscle use  CV:  Regular  rhythm,  No edema  GI:  Soft, Non distended, Non tender.  +Bowel sounds  Neurologic:  Alert and oriented X 3, normal speech,   Psych:   Good insight. Not anxious nor agitated  Skin:  No rashes. No jaundice    Reviewed most current lab test results and cultures  YES  Reviewed most current radiology test results   YES  Review and summation of old records today    NO  Reviewed patient's current orders and MAR    YES  PMH/SH reviewed - no change compared to H&P  ________________________________________________________________________  Care Plan discussed with:    Comments   Patient x    Family      RN x    Care Manager     Consultant                        Multidiciplinary team rounds were held today with , nursing, pharmacist and clinical coordinator. Patient's plan of care was discussed; medications were reviewed and discharge planning was addressed. ________________________________________________________________________  Total NON critical care TIME:  26   Minutes    Total CRITICAL CARE TIME Spent:   Minutes non procedure based      Comments   >50% of visit spent in counseling and coordination of care     ________________________________________________________________________  Sheila Acosta MD     Procedures: see electronic medical records for all procedures/Xrays and details which were not copied into this note but were reviewed prior to creation of Plan. LABS:  I reviewed today's most current labs and imaging studies.   Pertinent labs include:  Recent Labs     03/02/20  0139 02/29/20  0529 02/28/20  1256 WBC 3.7 3.0* 3.2*   HGB 13.5 12.7 14.0   HCT 41.5 38.0 42.9   * 102* 109*     Recent Labs     03/02/20  0139 03/01/20  0236 02/29/20  0529 02/28/20  1259    136 136 133*   K 4.2 4.8 4.5 4.4    104 105 100   CO2 25 26 25 27   GLU 99 98 92 89   BUN 39* 42* 45* 40*   CREA 1.97* 2.22* 2.46* 2.53*   CA 9.1 9.0 8.5 8.7   ALB  --   --   --  3.4*   TBILI  --   --   --  0.6   SGOT  --   --   --  52*   ALT  --   --   --  38       Signed: Eric Calixto MD

## 2020-03-02 NOTE — PROGRESS NOTES
Bedside shift change report given to DIANNA Marie (oncoming nurse) by Surya Cowart (offgoing nurse). Report included the following information SBAR, Kardex, Procedure Summary, Intake/Output, MAR, Recent Results and Cardiac Rhythm PACED.

## 2020-03-02 NOTE — PROGRESS NOTES
Problem: Falls - Risk of  Goal: *Absence of Falls  Description  Document Selinakasey Brennan Fall Risk and appropriate interventions in the flowsheet.   Outcome: Progressing Towards Goal  Note: Fall Risk Interventions:  Mobility Interventions: Bed/chair exit alarm, Communicate number of staff needed for ambulation/transfer, Patient to call before getting OOB         Medication Interventions: Bed/chair exit alarm, Evaluate medications/consider consulting pharmacy, Patient to call before getting OOB, Teach patient to arise slowly    Elimination Interventions: Bed/chair exit alarm, Call light in reach, Patient to call for help with toileting needs    History of Falls Interventions: Bed/chair exit alarm, Evaluate medications/consider consulting pharmacy, Investigate reason for fall

## 2020-03-03 VITALS
DIASTOLIC BLOOD PRESSURE: 80 MMHG | HEART RATE: 70 BPM | HEIGHT: 63 IN | WEIGHT: 136.47 LBS | BODY MASS INDEX: 24.18 KG/M2 | RESPIRATION RATE: 18 BRPM | TEMPERATURE: 98.5 F | SYSTOLIC BLOOD PRESSURE: 116 MMHG | OXYGEN SATURATION: 97 %

## 2020-03-03 LAB
ANION GAP SERPL CALC-SCNC: 6 MMOL/L (ref 5–15)
BUN SERPL-MCNC: 38 MG/DL (ref 6–20)
BUN/CREAT SERPL: 23 (ref 12–20)
CALCIUM SERPL-MCNC: 8.6 MG/DL (ref 8.5–10.1)
CHLORIDE SERPL-SCNC: 107 MMOL/L (ref 97–108)
CO2 SERPL-SCNC: 23 MMOL/L (ref 21–32)
CREAT SERPL-MCNC: 1.65 MG/DL (ref 0.55–1.02)
GLUCOSE SERPL-MCNC: 95 MG/DL (ref 65–100)
POTASSIUM SERPL-SCNC: 4.3 MMOL/L (ref 3.5–5.1)
SODIUM SERPL-SCNC: 136 MMOL/L (ref 136–145)

## 2020-03-03 PROCEDURE — 94760 N-INVAS EAR/PLS OXIMETRY 1: CPT

## 2020-03-03 PROCEDURE — 74011250637 HC RX REV CODE- 250/637: Performed by: INTERNAL MEDICINE

## 2020-03-03 PROCEDURE — 80048 BASIC METABOLIC PNL TOTAL CA: CPT

## 2020-03-03 PROCEDURE — 74011250636 HC RX REV CODE- 250/636: Performed by: INTERNAL MEDICINE

## 2020-03-03 PROCEDURE — 36415 COLL VENOUS BLD VENIPUNCTURE: CPT

## 2020-03-03 RX ORDER — LOSARTAN POTASSIUM 25 MG/1
12.5 TABLET ORAL DAILY
Qty: 30 TAB | Refills: 0 | Status: SHIPPED | OUTPATIENT
Start: 2020-03-04

## 2020-03-03 RX ORDER — FUROSEMIDE 20 MG/1
20 TABLET ORAL
Status: DISCONTINUED | OUTPATIENT
Start: 2020-03-03 | End: 2020-03-03

## 2020-03-03 RX ORDER — ISOSORBIDE MONONITRATE 30 MG/1
30 TABLET, EXTENDED RELEASE ORAL DAILY
Qty: 30 TAB | Refills: 0 | Status: SHIPPED | OUTPATIENT
Start: 2020-03-03

## 2020-03-03 RX ORDER — FUROSEMIDE 20 MG/1
20 TABLET ORAL DAILY
Status: DISCONTINUED | OUTPATIENT
Start: 2020-03-03 | End: 2020-03-03

## 2020-03-03 RX ORDER — METOPROLOL SUCCINATE 25 MG/1
25 TABLET, EXTENDED RELEASE ORAL DAILY
Qty: 30 TAB | Refills: 0 | Status: SHIPPED | OUTPATIENT
Start: 2020-03-04

## 2020-03-03 RX ORDER — FUROSEMIDE 40 MG/1
40 TABLET ORAL
Status: COMPLETED | OUTPATIENT
Start: 2020-03-03 | End: 2020-03-03

## 2020-03-03 RX ADMIN — HEPARIN SODIUM 5000 UNITS: 5000 INJECTION INTRAVENOUS; SUBCUTANEOUS at 00:17

## 2020-03-03 RX ADMIN — HEPARIN SODIUM 5000 UNITS: 5000 INJECTION INTRAVENOUS; SUBCUTANEOUS at 09:37

## 2020-03-03 RX ADMIN — ALBUTEROL SULFATE 2 PUFF: 90 AEROSOL, METERED RESPIRATORY (INHALATION) at 07:36

## 2020-03-03 RX ADMIN — ASPIRIN 81 MG 81 MG: 81 TABLET ORAL at 09:38

## 2020-03-03 RX ADMIN — Medication 10 ML: at 07:36

## 2020-03-03 RX ADMIN — LEVOTHYROXINE SODIUM 112 MCG: 112 TABLET ORAL at 07:15

## 2020-03-03 RX ADMIN — FUROSEMIDE 20 MG: 20 TABLET ORAL at 10:24

## 2020-03-03 RX ADMIN — FUROSEMIDE 40 MG: 40 TABLET ORAL at 15:09

## 2020-03-03 RX ADMIN — LEVOFLOXACIN 250 MG: 250 TABLET, FILM COATED ORAL at 14:02

## 2020-03-03 RX ADMIN — ISOSORBIDE MONONITRATE 30 MG: 30 TABLET, EXTENDED RELEASE ORAL at 09:38

## 2020-03-03 RX ADMIN — HYDROXYCHLOROQUINE SULFATE 200 MG: 200 TABLET ORAL at 09:40

## 2020-03-03 RX ADMIN — METOPROLOL SUCCINATE 25 MG: 25 TABLET, EXTENDED RELEASE ORAL at 09:37

## 2020-03-03 RX ADMIN — LOSARTAN POTASSIUM 12.5 MG: 25 TABLET ORAL at 09:38

## 2020-03-03 RX ADMIN — IPRATROPIUM BROMIDE 2 SPRAY: 42 SPRAY NASAL at 11:10

## 2020-03-03 RX ADMIN — Medication 10 ML: at 14:03

## 2020-03-03 NOTE — DISCHARGE SUMMARY
Hospitalist Discharge Summary     Patient ID:  Renee Augustin  [id number]  89 y.o.  1945  2/28/2020    PCP on record: Yissel Pete MD    Admit date: 2/28/2020  Discharge date and time: 3/3/2020    DISCHARGE DIAGNOSIS:  Hypotension   Acute kidney injury likely related to hypotension  Troponin elevation rule out non-STEMI  History of coronary artery disease status post cath  Chronic systolic congestive heart failure  History of CVA  History of paroxysmal atrial fibrillation  History of COPD not in exacerbation  History of lupus  History of orthostatic hypotension    CONSULTATIONS:  IP CONSULT TO CARDIOLOGY  IP CONSULT TO CARDIOLOGY    Excerpted HPI from H&P of Felipe Arriaza MD:  Renee Augustin is a 66 y.o.  female who presents with a past medical history of systolic congestive heart failure, COPD, atrial fibrillation is coming to the hospital with chief complaints of dizziness and low blood pressure. Patient reports being in her usual state of health until about 1 week ago when she started feeling dizzy, lightheaded and also had generalized weakness. She went to her cardiac rehab today and was very dizzy and also noted to have low blood pressure for which she was advised to go to the emergency department for further evaluation. She reports that her symptoms are worse mostly on standing up, does not describe it as a spinning sensation. She does not report any focal weakness, tingling, numbness or facial deviation. She does not report any chest pain or shortness of breath. She reports occasional phlegm upon coughing. She does have chronic swelling of the legs which is not any worse. She does not report any orthopnea or PND.     On arrival to the hospital, she was noted to have hypotension. On lab work she was noted to have normal CBC. BMP showed a creatinine of 2.53. First troponin was 0.82. BNP was elevated at 14,000.   Chest x-ray shows no acute findings.     We were asked to admit for work up and evaluation of the above problems. ______________________________________________________________________  DISCHARGE SUMMARY/HOSPITAL COURSE:  for full details see H&P, daily progress notes, labs, consult notes. Hypotension POA with acute on chronic renal insufficiency with baseline CKD  Chronic Systolic Heart Failure with EF 15%  Cr improving  Diuretics were held and given gentle IVF, no back on diuretics. Spoke to cardiology Dr Deangelo Francis, considering +3 Pedal edema and EF 15%, will resume lasix at 40 rather 20 to prevent going into acute CHF exacerbation  BB Added this admission  Enteresto Stopped this admission, ? May go back as an OP, OP cardiologist to decide  Imdur dose reduced to 30mg  BP meds adjusted yesterday, not hypotensive. Orthostatics also checked and not orthostatic  Recommend BMP as an OP with cardiology or PCP in 1 week  Cardiology ok with discharge, case discussed with Dr Deangelo Francis    Abnormal UA  UTI ruled out with insignificant colonies, pt asymptomatic as well    Troponin elevation   Cardiology doesn't believe NSTEMI, didn't offered any intervention  History of coronary artery disease status post cath  Patient reports that she had a coronary angiogram in October 2019 and was told that her vessels are too small for stenting  Continue her home aspirin and statin     History of CVA  History of paroxysmal atrial fibrillation  History of COPD not in exacerbation  History of lupus  History of orthostatic hypotension  Continue home aspirin  Continue home inhalers  Continue home chloroquine   _______________________________________________________________________  Patient seen and examined by me on discharge day.   Pertinent Findings:  Gen:    Not in distress  Chest: Clear lungs  CVS:   Regular rhythm.  +++ edema  Abd:  Soft, not distended, not tender  Neuro:  Alert, non focal  _______________________________________________________________________  DISCHARGE MEDICATIONS:   Current Discharge Medication List      START taking these medications    Details   losartan (COZAAR) 25 mg tablet Take 0.5 Tabs by mouth daily. Qty: 30 Tab, Refills: 0      metoprolol succinate (TOPROL-XL) 25 mg XL tablet Take 1 Tab by mouth daily. Qty: 30 Tab, Refills: 0         CONTINUE these medications which have CHANGED    Details   isosorbide mononitrate ER (IMDUR) 30 mg tablet Take 1 Tab by mouth daily. Qty: 30 Tab, Refills: 0         CONTINUE these medications which have NOT CHANGED    Details   denosumab (PROLIA) 60 mg/mL injection 60 mg by SubCUTAneous route once. Every 6 months. Patient says she is due for another one soon. umeclidinium-vilanterol (ANORO ELLIPTA) 62.5-25 mcg/actuation inhaler Take 1 Puff by inhalation daily. furosemide (LASIX) 40 mg tablet Take 40 mg by mouth daily. ranolazine ER (RANEXA) 500 mg SR tablet Take 500 mg by mouth two (2) times a day. ipratropium (ATROVENT) 42 mcg (0.06 %) nasal spray 2 Sprays by Both Nostrils route daily. aspirin 81 mg chewable tablet Take 1 Tab by mouth daily. Qty: 30 Tab, Refills: 11      pravastatin (PRAVACHOL) 40 mg tablet Take 40 mg by mouth nightly. albuterol (PROVENTIL HFA, VENTOLIN HFA) 90 mcg/actuation inhaler Take 2 Puffs by inhalation every four (4) hours as needed. levothyroxine (SYNTHROID) 112 mcg tablet Take 112 mcg by mouth Daily (before breakfast). hydroxychloroquine (PLAQUENIL) 200 mg tablet Take 200 mg by mouth two (2) times a day. STOP taking these medications       sacubitril-valsartan (ENTRESTO) 97 mg/103 mg tablet Comments:   Reason for Stopping:                Follow-up Information     Follow up With Specialties Details Why Contact Info    Mari Shrestha MD Family Practice Schedule an appointment as soon as possible for a visit in 1 week  [address]  [address]  [address] [place] [address]      Dearmarlyn Stewart MD Cardio Vascular Surgery Schedule an appointment as soon as possible for a visit in 1 week  [address] [address] [phone]      basic metabolic panel  In 1 week with your primary care physician or Dr Calles Hams         ________________________________________________________________    Risk of deterioration: Moderate    Condition at Discharge:  Stable  __________________________________________________________________    Disposition  Home with family, no needs    ____________________________________________________________________    Code Status: Full Code  ___________________________________________________________________      Total time in minutes spent coordinating this discharge (includes going over instructions, follow-up, prescriptions, and preparing report for sign off to her PCP) :  35 minutes    Signed:  Lacy Meeks MD

## 2020-03-03 NOTE — PROGRESS NOTES
Pt to be discharged to home via family transportation. Discharge instructions, follow up appts, and hard scripts given and explained to pt. IV and telemetry discontinued. Checked room for any belongings.

## 2020-03-03 NOTE — PROGRESS NOTES
Problem: Falls - Risk of  Goal: *Absence of Falls  Description  Document Nneka President Fall Risk and appropriate interventions in the flowsheet.   Outcome: Progressing Towards Goal  Note: Fall Risk Interventions:  Mobility Interventions: Bed/chair exit alarm, Communicate number of staff needed for ambulation/transfer, Patient to call before getting OOB, Utilize walker, cane, or other assistive device         Medication Interventions: Bed/chair exit alarm, Evaluate medications/consider consulting pharmacy, Patient to call before getting OOB, Teach patient to arise slowly    Elimination Interventions: Bed/chair exit alarm, Call light in reach, Patient to call for help with toileting needs    History of Falls Interventions: Bed/chair exit alarm, Evaluate medications/consider consulting pharmacy

## 2020-03-03 NOTE — PROGRESS NOTES
Cardiology Progress Note  3/3/2020     Admit Date: 2/28/2020  Admit Diagnosis: Hypotension [I95.9]  KARMA (acute kidney injury) ([facility] [address] [N17.9]  Dehydration [V34.5]  Chronic systolic CHF (congestive heart failure) ([facility] [address] [I50.22]  CC: none currently  Cardiologist:  Dr Aubree Oakes in 2016; Dr Corey Painting since then. Cardiac Assessment/Plan:    1) Ischemic CM: Remote CABG; she reports cath 10/2019 @ [facility] showed \"small vessels, nothing could be done. \" Baseline EF 15-20% (9/2019, no change vs 2018). RVE with decreased RV Fxn; mild-mod MR; mod-severe TR.     2) Primary prevention ICD: 10/2019 (Jony Sci).    3) CKD: ?baseline  4) HTN  5) COPD  6) SLE  7) Spinal stenosis. 8) ? PAFib: eliquis started 2016 after CVAs note on imaging. 9) RU lobectomy for lung CA  10) GERD  11) PVD: RLE stent @ [facility] 2013.     Presenting from rehab with hypotension; no CP/acute dyspnea; KARMA by Cr, indeterminate troponin. Paced ECG.     Rec; not having an ACS; agree with gentle hydration; watch Cr/cycle enzymes. Hold home entresto, imdur, lasix for now; She should be on toprol Xl, unless previously intolerant. Monitor orthostatics.      3/2: BPs 100-120; HR 60-90s; Paced on tele. Hg nl; Cr 2.  Cardiac Meds: asa 81; imdur 60; toprol XL 25; pravachol 40; ranexa 500 bid. Rec: decrease imdur to 30; add losartan 12.5 qday; transition to entresto as outpt (via Dr Kavon Brown). If tolerating meds, ok for d/c from cardiac standpoint; will need some lasix on d/c: would Rx with lasix 20 qam.  F/U with Dr Kavon Brown. 3/3: VSSAF; Paced on tele. Cr 1.65. She had some dyspnea last night c/w PND: none now. Rec cont meds above (asa 81; imdur 30; toprol XL 25; pravachol 40; ranexa 500 bid. losartan 12.5 qday;); added lasix 20 qday. transition to entresto as outpt (via Dr Kavon Brown).   Dispo per primary team.    ____________________________________________________________________  José Miguel Montez is a 66 y.o. female presents with Hypotension [I95.9]  KARMA (acute kidney injury) ([facility] [address] [N17.9]  Dehydration [F33.6]  Chronic systolic CHF (congestive heart failure) ([facility] [address] [I50.22].    As noted POA: \"The patient reports no chest pain/pressure; chronic FORRESTER and LE edema that is unchanged. No PND, orthopnea, palpitations, pre-syncope, syncope. No current complaints. She reports compliance with meds/diet; no recent acute illness.     She felt marked fatigue, some lightheaded earlier today at rehab; BP was low (80s) so sent to Er; SBP currently 94.     ECG: Vpacing, prob sinus  Tele: Vpacing  CXR: \"No acute findings. \"     Notable labs: Hg 14; Na 133; Cr 2.53; BUN 40; proBNP 14k; trop 0.8 (no CK). \"   ____________________________________________________________________________________  Notable prior cardiac history:  @ [facility] 8/2016:       Patient with hypertension with some orthostasis after cva, dyslipidemia, SLE, GERD, PVD with right lower ext stents Dr. Mariano Wallace, lung CA s/p RUL lobectomy in 1999/copd/quit tobacco follows with Dr. Gisela Norton, cholecystectomy, kidney stones, spinal stenosis, EGD/colonoscopy 2/2016 Dr. Lebron Stock. 5V CABG in 2004. Presented with chest pain and syncope 6/2015. Cath showed patent svg to LAD, LAD with distal small vessel disease, atretic LIMA, LCX is codominant and has two subtotalled OM's distal LCx has 90% stenosis prior to another OM, RCA occluded. Post cath found to have multiple CVA, concern for embolic, now on eliquis, echo 6/2015 EF 40%, carotid with < 50% bilaterally, sinus with RBBB/LAHB. Small nstemi 1/2016 treated medically. Hypertensive urgency 8/2016. Limited functional capacity, currently exercising at home every day, walking at mall. Feeling better with titration of medications. No chest pain. Stable dyspnea. No chest discomfort suggestive of ischemia. Denies orthopnea, PND, or edema. No palpitations, syncope, near syncope. No other complaints.     IMPRESSION AND PLAN  01.  Atherosclerotic heart disease of native coronary artery with other forms of angina pectoris:  Severe branch vessel disease. Class II angina. Small NSTEMI 1/2016 managed medically. Angina in setting of hypertensive urgency in 8/2016. Stable class II symptoms again. ECG done today  We will continue the current medical therapy. 02. Ischemic cardiomyopathy:  On beta blocker and ARB. 03. Bifascicular block:  Found to have CVA, being treated for possible proxysmal afib empirically, never documented. 04. Essential (primary) hypertension:  Adequately controlled. Better with addition of amlodapine and increase of metoprolol. 05. Mixed hyperlipidemia:  Crestor to expensive, did not tolerate atorvastatin, doing ok with pravastatin. 06. Personal history of nicotine dependence:  remains abstinent from tobacco use. 07. Long term (current) use of anticoagulants: This condition is stable. 08. Long term (current) use of aspirin:  This condition is stable. 09. Body mass index (BMI) 25.0-25.9, adult   10.  Body mass index (BMI) 23.0-23.9, adult            ORDERS:  1 ECG done today   2 Return office visit with Yamilet Palacio MD in 6 Months.      8/2016 MEDICATION LIST     Medication Sig Description   albuterol sulfate HFA 90 mcg/actuation aerosol inhaler inhale 2 puff by inhalation route  every 4 - 6 hours as needed   aspirin 81 mg tablet,delayed release take 1 tablet by oral route  every day   Eliquis 5 mg tablet take 1 tablet by oral route 2 times every day   hydrochlorothiazide 12.5 mg tablet take 1 tablet by oral route  every day   isosorbide mononitrate ER 60 mg tablet,extended release 24 hr take 1 tablet by oral route  every day in the morning   losartan 100 mg tablet take 1 tablet by oral route  every day   metoprolol tartrate 50 mg tablet take 1 tablet by oral route 2 times every day with meals   nitroglycerin 0.4 mg sublingual tablet place 1 tablet by sublingual route at the 1st sign of attack; may repeat every 5 min until relief; if pain persists after 3 tablets in 15 min, prompt medical attention is recommended   Norvasc 5 mg tablet take 1 tablet by oral route  every morning   pantoprazole 40 mg tablet,delayed release take 1 tablet by oral route  every day   Plaquenil 200 mg tablet take 1 tablet by oral route 2 times every day   pravastatin 40 mg tablet take 1 tablet by oral route  every day   Spiriva with HandiHaler 18 mcg & inhalation capsules inhale 1 capsule by inhalation route  every day   Synthroid 88 mcg tablet take 1 tablet by oral route  every day   Vitamin D3 1,000 unit tablet 1 po qd   ______________________________________________________________________________  CARDIAC HISTORY  RISK FACTORS:  1 Peripheral Vascular Disease   2 Hypertension   3 Dyslipidemia         CARDIOVASCULAR PROCEDURES  CATH LAB:  Cath (Patent svg to LAD, ?lima to diag, atretic.   native lCx with distal stenosis, RCA occluded in mid portion) - 6/14/2015   CV SURGERY:  CV Surgery (CABG) - 2004   Vasc Surgery (Stents in legs [address]) - 3/2012   ECHO/MUGA:  Echo (EF 0.40 (40%)) - 6/15/2015   ELECTROPHYSIOLOGY:  EKG (Sinus Rhythm, Hr 58, PAC,  RBBB, LAHB) - 8/18/2016   STRESS TESTS:  [place] MPI (EF 0.74, Small area of inferior ischemia. Unchanged from stress tests dating back to 10/2008 and consistant with patients known occluded SVG to RCA. ) - 8/15/2013   VASCULAR:  Carotid Duplex (Less than 50% Bilateral ICAs) - 6/17/2015     For other plans, see orders.   Hospital problem list   Active Hospital Problems    Diagnosis Date Noted    Dehydration 02/28/2020    Hypotension 02/28/2020    KARMA (acute kidney injury) ([facility] [address] 02/28/2020    Chronic systolic CHF (congestive heart failure) ([facility] [address] 02/28/2020        Subjective: Young Atkinsts reports   Chest pain X none  consistent with:  Non-cardiac CP         Atypical CP     None now  On going  Anginal CP     Dyspnea X none  at rest  with exertion         improved  unchanged  worse              PND X none  overnight Orthopnea X none  improved  unchanged  worse   Presyncope X none  improved  unchanged  worse     Ambulated in hallway without symptoms  x Yes   Ambulated in room without symptoms x Yes   Objective:    Physical Exam:  Overall VSSAF;    Visit Vitals  /76 (BP 1 Location: Left arm, BP Patient Position: At rest)   Pulse 71   Temp 97.9 °F (36.6 °C)   Resp 17   Ht 5' 3\" (1.6 m)   Wt 61.9 kg (136 lb 7.4 oz)   SpO2 99%   Breastfeeding No   BMI 24.17 kg/m²     Temp (24hrs), Av.1 °F (36.7 °C), Min:97.9 °F (36.6 °C), Max:98.5 °F (36.9 °C)    Patient Vitals for the past 8 hrs:   Pulse   20 0727 71   20 0402 100     Patient Vitals for the past 8 hrs:   Resp   20 0727 17   20 0402 17     Patient Vitals for the past 8 hrs:   BP   20 0727 115/76   20 0402 135/88     No intake or output data in the 24 hours ending 20 0942  General Appearance: Well developed, elderly, no acute distress. Ears/Nose/Mouth/Throat:   Normal MM; anicteric. JVP: WNL   Resp:   clear to auscultation bilaterally. Nl resp effort. Cardiovascular:  RRR, S1, S2 normal, no new murmur. No gallop or rub. Abdomen:   Soft, non-tender, bowel sounds are present. Extremities: trivial edema bilaterally. Skin:  Neuro: Warm and dry. A/O x3, grossly nonfocal   cath site intact w/o hematoma or new bruit; distal pulse unchanged  Yes   Data Review:     Telemetry independently reviewed x paced  chronic afib  parox afib  NSVT     ECG independently reviewed  NSR  afib  no significant changes  NSST-Tw chgs    no new ECG provided for review   Lab results reviewed as noted below. Current medications reviewed as noted below. No results for input(s): PH, PCO2, PO2 in the last 72 hours. No results for input(s): CPK, CKMB, CKNDX, TROIQ in the last 72 hours.   Recent Labs     20  0024 20  0139 20  0236    136 136   K 4.3 4.2 4.8    104 104   CO2 23 25 26   BUN 38* 39* 42*   CREA 1.65* 1.97* 2.22*   GFRAA 37* 30* 26*   GLU 95 99 98   CA 8.6 9.1 9.0   WBC  --  3.7  --    HGB  --  13.5  --    HCT  --  41.5  --    PLT  --  116*  --      Lab Results   Component Value Date/Time    Cholesterol, total 129 02/29/2020 05:29 AM    HDL Cholesterol 35 02/29/2020 05:29 AM    LDL, calculated 71.6 02/29/2020 05:29 AM    Triglyceride 112 02/29/2020 05:29 AM    CHOL/HDL Ratio 3.7 02/29/2020 05:29 AM     No results for input(s): SGOT, GPT, AP, TBIL, TP, ALB, GLOB, GGT, AML, LPSE in the last 72 hours. No lab exists for component: AMYP, HLPSE  No results for input(s): INR, PTP, APTT, INREXT, INREXT in the last 72 hours.    No components found for: GLPOC    Current Facility-Administered Medications   Medication Dose Route Frequency    furosemide (LASIX) tablet 20 mg  20 mg Oral DAILY    isosorbide mononitrate ER (IMDUR) tablet 30 mg  30 mg Oral DAILY    losartan (COZAAR) tablet 12.5 mg  12.5 mg Oral DAILY    levoFLOXacin (LEVAQUIN) tablet 250 mg  250 mg Oral Q24H    metoprolol succinate (TOPROL-XL) XL tablet 25 mg  25 mg Oral DAILY    acetaminophen (TYLENOL) tablet 650 mg  650 mg Oral Q6H PRN    albuterol (PROVENTIL HFA, VENTOLIN HFA, PROAIR HFA) inhaler 2 Puff  2 Puff Inhalation Q4H PRN    aspirin chewable tablet 81 mg  81 mg Oral DAILY    hydroxychloroquine (PLAQUENIL) tablet 200 mg  200 mg Oral BID    ipratropium (ATROVENT) 42 mcg (0.06 %) nasal spray 2 Spray  2 Spray Both Nostrils DAILY    levothyroxine (SYNTHROID) tablet 112 mcg  112 mcg Oral ACB    pravastatin (PRAVACHOL) tablet 40 mg  40 mg Oral QHS    ranolazine ER (RANEXA) tablet 500 mg  500 mg Oral BID    sodium chloride (NS) flush 5-40 mL  5-40 mL IntraVENous Q8H    sodium chloride (NS) flush 5-40 mL  5-40 mL IntraVENous PRN    docusate sodium (COLACE) capsule 100 mg  100 mg Oral DAILY PRN    heparin (porcine) injection 5,000 Units  5,000 Units SubCUTAneous Army Radha MD

## 2020-03-03 NOTE — PROGRESS NOTES
NUTRITION:     Received phone call pedro. education on low-sodium diet before dc this afternoon. Visited pt in room, pt c/o dislike for food after cutting out salt recently, c/o lack of flavor. Pt reports eating because she knows she has to, not because she wants to. Family members are expressing concern. Pt described transition to general heart healthy diet (more produce, less canned/frozen, less salt, less TV dinner) and mentioned she has been to \"classes\" about heart healthy eating. Provided brief education on cooking with spices/flavoring tips and materials to pt. Pt expressed frustration with having to buy multiple spices for one recipe d/t fixed income. Noted that pt mentioned she \"chews and chews and chews and its hard to swallow\".  Tried to clarify with pt if she has experienced a difficult time swallowing, she denied issues swallowing, stated she doesn't want to when it tastes bland.    -Claire Christie   Dietetic Intern

## 2020-03-03 NOTE — PROGRESS NOTES
2p  I called Dr Meghann Maldonado at [phone] #1 #3. I spoke with Triston Cee who states they have stopped making follow up appointments as \"9 times out of 10, the pt does not show up\". She asked that I have the pt call which I have done    11a  D/C plans discussed with pt who anticipates d/c to home today. She says family will transport. She declines the need for HHC. She requests a dietician to stop by to discuss seasonings on her bland food- I called. CORTES:  -patient does report that she is independent in her ADLs and IADLs       -saw PCP ~2 months ago  -dtr or son will transport at d/c  -the patient is a full code and she has a MPOA/AD on-file   -patient resides in a 1 level home with 3 steps to enter by herself.   -She has 1 daughter and 1 son that live locally. She has 4 granddaughters and 7 great-grandchildren.  She reports her family is very supportive.   -uses a dolomite walker when ambulating.

## 2020-03-03 NOTE — DISCHARGE INSTRUCTIONS
HOSPITALIST DISCHARGE INSTRUCTIONS    NAME: Brendan Paget   :  1945   MRN:  [id number]     Date/Time:  3/3/2020 2:12 PM    ADMIT DATE: 2020     DISCHARGE DATE: 3/3/2020     DISCHARGE DIAGNOSIS:  Hypotension   Acute kidney injury likely related to hypotension  Troponin elevation rule out non-STEMI  History of coronary artery disease status post cath  Chronic systolic congestive heart failure  History of CVA  History of paroxysmal atrial fibrillation  History of COPD not in exacerbation  History of lupus  History of orthostatic hypotension    MEDICATIONS:  · It is important that you take the medication exactly as they are prescribed. · Keep your medication in the bottles provided by the pharmacist and keep a list of the medication names, dosages, and times to be taken in your wallet. · Do not take other medications without consulting your doctor. Pain Management: per above medications    What to do at Home    Recommended diet:  Cardiac Diet    Recommended activity: Activity as tolerated    If you have questions regarding the hospital related prescriptions or hospital related issues please call [facility] surjit Anderson at [phone]. If you experience any of the following symptoms then please call your primary care physician or return to the emergency room if you cannot get hold of your doctor:  Fever, chills, nausea, vomiting, diarrhea, change in mentation, falling, bleeding, shortness of breath      Information obtained by :  I understand that if any problems occur once I am at home I am to contact my physician. I understand and acknowledge receipt of the instructions indicated above.                                                                                                                                            Physician's or R.N.'s Signature                                                                  Date/Time Patient or Representative Signature                                                          Date/Time

## 2020-03-03 NOTE — PROGRESS NOTES
Bedside shift change report given to DIANNA Marie (oncoming nurse) by Cate Charles (offgoing nurse). Report included the following information SBAR, Kardex, Intake/Output, MAR, Recent Results and Cardiac Rhythm PACED.

## 2020-03-18 ENCOUNTER — APPOINTMENT (OUTPATIENT)
Dept: CARDIAC REHAB | Age: 75
End: 2020-03-18

## 2020-03-20 ENCOUNTER — APPOINTMENT (OUTPATIENT)
Dept: CARDIAC REHAB | Age: 75
End: 2020-03-20

## 2020-03-25 ENCOUNTER — APPOINTMENT (OUTPATIENT)
Dept: CARDIAC REHAB | Age: 75
End: 2020-03-25

## 2020-03-26 ENCOUNTER — TELEPHONE (OUTPATIENT)
Dept: CARDIAC REHAB | Age: 75
End: 2020-03-26

## 2020-03-26 NOTE — TELEPHONE ENCOUNTER
Spoke with Windy Solorio on 3/26/2020 for follow up Cardiopulmonary telephone counseling. Pt currently re-hospitalized. She reported, \"I'm back in the hospital.  I've got a lot of fluid and my kidneys are functioning well. \"      Pt advised to focus on health improvements and that CR will f/u and inform pt of return to normal operation.       Joaquin White RN

## 2020-03-27 ENCOUNTER — APPOINTMENT (OUTPATIENT)
Dept: CARDIAC REHAB | Age: 75
End: 2020-03-27

## 2020-04-01 ENCOUNTER — APPOINTMENT (OUTPATIENT)
Dept: CARDIAC REHAB | Age: 75
End: 2020-04-01

## 2020-04-03 ENCOUNTER — APPOINTMENT (OUTPATIENT)
Dept: CARDIAC REHAB | Age: 75
End: 2020-04-03

## 2020-04-09 ENCOUNTER — TELEPHONE (OUTPATIENT)
Dept: CARDIAC REHAB | Age: 75
End: 2020-04-09

## 2020-04-09 NOTE — TELEPHONE ENCOUNTER
Spoke with Samina Cain on <date> for follow up Cardiopulmonary telephone counseling. Patient still in the hospital but reports she is being discharged today. Suggested she begin very short walks around the house several times per day to begin to regain her strength. Reminded her to monitor sodium intake, record daily weights, and reminded her the parameters for when to call the doctor with fluid retention. Informed patient that we would follow up in a couple weeks.      Mulu Boucher RN

## 2020-04-17 ENCOUNTER — TELEPHONE (OUTPATIENT)
Dept: CARDIAC REHAB | Age: 75
End: 2020-04-17

## 2020-04-17 NOTE — TELEPHONE ENCOUNTER
Attempted to reach patient to discuss cardiac rehab nutrition therapy during this period of clinic visit closure due to to COVID-19. Left call back number. Will try again next week.

## 2020-06-19 ENCOUNTER — TELEPHONE (OUTPATIENT)
Dept: CARDIAC REHAB | Age: 75
End: 2020-06-19

## 2020-07-29 ENCOUNTER — TELEPHONE (OUTPATIENT)
Dept: CARDIAC REHAB | Age: 75
End: 2020-07-29

## 2020-07-29 NOTE — TELEPHONE ENCOUNTER
Multiple attempts made to contact patient to reschedule Cardiac Rehab visits. No answer or call back.  Patient will be discharged from the program.

## 2022-03-19 PROBLEM — N17.9 AKI (ACUTE KIDNEY INJURY) (HCC): Status: ACTIVE | Noted: 2020-02-28

## 2022-03-19 PROBLEM — E86.0 DEHYDRATION: Status: ACTIVE | Noted: 2020-02-28

## 2022-03-19 PROBLEM — I95.9 HYPOTENSION: Status: ACTIVE | Noted: 2020-02-28

## 2022-03-20 PROBLEM — I50.22 CHRONIC SYSTOLIC CHF (CONGESTIVE HEART FAILURE) (HCC): Status: ACTIVE | Noted: 2020-02-28

## 2023-05-12 RX ORDER — PRAVASTATIN SODIUM 40 MG
40 TABLET ORAL NIGHTLY
COMMUNITY

## 2023-05-12 RX ORDER — METOPROLOL SUCCINATE 25 MG/1
TABLET, EXTENDED RELEASE ORAL DAILY
COMMUNITY
Start: 2020-03-04

## 2023-05-12 RX ORDER — ISOSORBIDE MONONITRATE 30 MG/1
TABLET, EXTENDED RELEASE ORAL DAILY
COMMUNITY
Start: 2020-03-03

## 2023-05-12 RX ORDER — RANOLAZINE 500 MG/1
TABLET, EXTENDED RELEASE ORAL 2 TIMES DAILY
COMMUNITY

## 2023-05-12 RX ORDER — LOSARTAN POTASSIUM 25 MG/1
TABLET ORAL DAILY
COMMUNITY
Start: 2020-03-04

## 2023-05-12 RX ORDER — FUROSEMIDE 40 MG/1
40 TABLET ORAL DAILY
COMMUNITY

## 2023-05-12 RX ORDER — HYDROXYCHLOROQUINE SULFATE 200 MG/1
TABLET, FILM COATED ORAL 2 TIMES DAILY
COMMUNITY

## 2023-05-12 RX ORDER — IPRATROPIUM BROMIDE 42 UG/1
2 SPRAY, METERED NASAL DAILY
COMMUNITY

## 2023-05-12 RX ORDER — ALBUTEROL SULFATE 90 UG/1
2 AEROSOL, METERED RESPIRATORY (INHALATION) EVERY 4 HOURS PRN
COMMUNITY

## 2023-05-12 RX ORDER — ASPIRIN 81 MG/1
TABLET, CHEWABLE ORAL DAILY
COMMUNITY
Start: 2016-01-04

## 2023-05-12 RX ORDER — LEVOTHYROXINE SODIUM 112 UG/1
TABLET ORAL
COMMUNITY

## (undated) DEVICE — BAG BELONG PT PERS CLEAR HANDL

## (undated) DEVICE — Z DISCONTINUED NO SUB IDED SET EXTN W/ 4 W STPCOCK M SPIN LOK 36IN

## (undated) DEVICE — SET ADMIN 16ML TBNG L100IN 2 Y INJ SITE IV PIGGY BK DISP

## (undated) DEVICE — KENDALL RADIOLUCENT FOAM MONITORING ELECTRODE -RECTANGULAR SHAPE: Brand: KENDALL

## (undated) DEVICE — SOLIDIFIER FLUID 3000 CC ABSORB

## (undated) DEVICE — 1200 GUARD II KIT W/5MM TUBE W/O VAC TUBE: Brand: GUARDIAN

## (undated) DEVICE — BW-412T DISP COMBO CLEANING BRUSH: Brand: SINGLE USE COMBINATION CLEANING BRUSH

## (undated) DEVICE — NEEDLE HYPO 18GA L1.5IN PNK S STL HUB POLYPR SHLD REG BVL

## (undated) DEVICE — BAG SPEC BIOHZD LF 2MIL 6X10IN -- CONVERT TO ITEM 357326

## (undated) DEVICE — SYRINGE MED 20ML STD CLR PLAS LUERLOCK TIP N CTRL DISP

## (undated) DEVICE — CATH IV AUTOGRD BC BLU 22GA 25 -- INSYTE

## (undated) DEVICE — CONTAINER SPEC 20 ML LID NEUT BUFF FORMALIN 10 % POLYPR STS

## (undated) DEVICE — ENDO CARRY-ON PROCEDURE KIT INCLUDES ENZYMATIC SPONGE, GAUZE, BIOHAZARD LABEL, TRAY, LUBRICANT, DIRTY SCOPE LABEL, WATER LABEL, TRAY, DRAWSTRING PAD, AND DEFENDO 4-PIECE KIT.: Brand: ENDO CARRY-ON PROCEDURE KIT

## (undated) DEVICE — CANN NASAL O2 CAPNOGRAPHY AD -- FILTERLINE

## (undated) DEVICE — KIT IV STRT W CHLORAPREP PD 1ML

## (undated) DEVICE — FORCEPS BX L240CM JAW DIA2.8MM L CAP W/ NDL MIC MESH TOOTH